# Patient Record
Sex: MALE | Race: AMERICAN INDIAN OR ALASKA NATIVE | NOT HISPANIC OR LATINO | Employment: UNEMPLOYED | URBAN - METROPOLITAN AREA
[De-identification: names, ages, dates, MRNs, and addresses within clinical notes are randomized per-mention and may not be internally consistent; named-entity substitution may affect disease eponyms.]

---

## 2021-05-24 ENCOUNTER — RECORDS - HEALTHEAST (OUTPATIENT)
Dept: ADMINISTRATIVE | Facility: CLINIC | Age: 46
End: 2021-05-24

## 2023-08-10 ENCOUNTER — OFFICE VISIT (OUTPATIENT)
Dept: BEHAVIORAL HEALTH | Facility: CLINIC | Age: 48
End: 2023-08-10
Payer: COMMERCIAL

## 2023-08-10 VITALS — SYSTOLIC BLOOD PRESSURE: 143 MMHG | WEIGHT: 190 LBS | HEART RATE: 66 BPM | DIASTOLIC BLOOD PRESSURE: 95 MMHG

## 2023-08-10 DIAGNOSIS — F33.41 RECURRENT MAJOR DEPRESSIVE DISORDER, IN PARTIAL REMISSION (H): ICD-10-CM

## 2023-08-10 DIAGNOSIS — Z72.0 TOBACCO USE: ICD-10-CM

## 2023-08-10 DIAGNOSIS — F11.20 OPIOID USE DISORDER, SEVERE, DEPENDENCE (H): Primary | ICD-10-CM

## 2023-08-10 DIAGNOSIS — F10.90 ALCOHOL USE DISORDER: ICD-10-CM

## 2023-08-10 DIAGNOSIS — Z51.81 ENCOUNTER FOR MONITORING OPIOID MAINTENANCE THERAPY: ICD-10-CM

## 2023-08-10 DIAGNOSIS — Z79.891 ENCOUNTER FOR MONITORING OPIOID MAINTENANCE THERAPY: ICD-10-CM

## 2023-08-10 LAB
AMPHETAMINE QUAL URINE POCT: NEGATIVE
BARBITURATE QUAL URINE POCT: NEGATIVE
BENZODIAZEPINE QUAL URINE POCT: NEGATIVE
BUPRENORPHINE QUAL URINE POCT: ABNORMAL
COCAINE QUAL URINE POCT: NEGATIVE
CREATININE QUAL URINE POCT: ABNORMAL
FENTANYL UR QL: NORMAL
INTERNAL QC QUAL URINE POCT: ABNORMAL
MDMA QUAL URINE POCT: NEGATIVE
METHADONE QUAL URINE POCT: NEGATIVE
METHAMPHETAMINE QUAL URINE POCT: NEGATIVE
OPIATE QUAL URINE POCT: NEGATIVE
OXYCODONE QUAL URINE POCT: NEGATIVE
PH QUAL URINE POCT: ABNORMAL
PHENCYCLIDINE QUAL URINE POCT: NEGATIVE
POCT KIT EXPIRATION DATE: ABNORMAL
POCT KIT LOT NUMBER: ABNORMAL
SPECIFIC GRAVITY POCT: 1.02
TEMPERATURE URINE POCT: ABNORMAL
THC QUAL URINE POCT: NEGATIVE

## 2023-08-10 PROCEDURE — 99205 OFFICE O/P NEW HI 60 MIN: CPT | Performed by: FAMILY MEDICINE

## 2023-08-10 PROCEDURE — 80307 DRUG TEST PRSMV CHEM ANLYZR: CPT | Performed by: FAMILY MEDICINE

## 2023-08-10 PROCEDURE — 99207 PR NO BILLABLE SERVICE THIS VISIT: CPT

## 2023-08-10 RX ORDER — OLANZAPINE 5 MG/1
5 TABLET ORAL AT BEDTIME
COMMUNITY
Start: 2022-10-17 | End: 2023-09-05

## 2023-08-10 RX ORDER — BUPRENORPHINE AND NALOXONE 8; 2 MG/1; MG/1
3 FILM, SOLUBLE BUCCAL; SUBLINGUAL DAILY
Qty: 45 FILM | Refills: 0 | Status: SHIPPED | OUTPATIENT
Start: 2023-08-10 | End: 2023-08-22

## 2023-08-10 RX ORDER — CYPROHEPTADINE HYDROCHLORIDE 4 MG/1
4 TABLET ORAL 3 TIMES DAILY PRN
COMMUNITY
End: 2024-03-08

## 2023-08-10 RX ORDER — VENLAFAXINE HYDROCHLORIDE 37.5 MG/1
CAPSULE, EXTENDED RELEASE ORAL
COMMUNITY
Start: 2022-11-11 | End: 2023-08-10

## 2023-08-10 RX ORDER — VITAMIN B COMPLEX
1 TABLET ORAL DAILY
COMMUNITY
Start: 2023-06-06 | End: 2024-03-08

## 2023-08-10 RX ORDER — OLANZAPINE 2.5 MG/1
2.5 TABLET, FILM COATED ORAL
COMMUNITY
Start: 2023-05-30 | End: 2023-09-05

## 2023-08-10 RX ORDER — VENLAFAXINE HYDROCHLORIDE 150 MG/1
150 CAPSULE, EXTENDED RELEASE ORAL DAILY
COMMUNITY
End: 2023-09-05

## 2023-08-10 RX ORDER — VENLAFAXINE HYDROCHLORIDE 75 MG/1
75 CAPSULE, EXTENDED RELEASE ORAL DAILY
COMMUNITY
Start: 2022-12-13 | End: 2023-08-10

## 2023-08-10 ASSESSMENT — COLUMBIA-SUICIDE SEVERITY RATING SCALE - C-SSRS
3. HAVE YOU BEEN THINKING ABOUT HOW YOU MIGHT KILL YOURSELF?: YES
LETHALITY/MEDICAL DAMAGE CODE FIRST ACTUAL ATTEMPT: NO PHYSICAL DAMAGE OR VERY MINOR PHYSICAL DAMAGE
5. HAVE YOU STARTED TO WORK OUT OR WORKED OUT THE DETAILS OF HOW TO KILL YOURSELF? DO YOU INTEND TO CARRY OUT THIS PLAN?: YES
1. IN THE PAST MONTH, HAVE YOU WISHED YOU WERE DEAD OR WISHED YOU COULD GO TO SLEEP AND NOT WAKE UP?: YES
TOTAL  NUMBER OF ABORTED OR SELF INTERRUPTED ATTEMPTS LIFETIME: NO
ATTEMPT LIFETIME: YES
LETHALITY/MEDICAL DAMAGE CODE MOST RECENT ACTUAL ATTEMPT: NO PHYSICAL DAMAGE OR VERY MINOR PHYSICAL DAMAGE
4. HAVE YOU HAD THESE THOUGHTS AND HAD SOME INTENTION OF ACTING ON THEM?: NO
TOTAL  NUMBER OF INTERRUPTED ATTEMPTS LIFETIME: NO
1. HAVE YOU WISHED YOU WERE DEAD OR WISHED YOU COULD GO TO SLEEP AND NOT WAKE UP?: YES
2. HAVE YOU ACTUALLY HAD ANY THOUGHTS OF KILLING YOURSELF?: YES
REASONS FOR IDEATION PAST MONTH: MOSTLY TO END OR STOP THE PAIN (YOU COULDN'T GO ON LIVING WITH THE PAIN OR HOW YOU WERE FEELING)
ATTEMPT PAST THREE MONTHS: NO
2. HAVE YOU ACTUALLY HAD ANY THOUGHTS OF KILLING YOURSELF?: YES
4. HAVE YOU HAD THESE THOUGHTS AND HAD SOME INTENTION OF ACTING ON THEM?: NO
5. HAVE YOU STARTED TO WORK OUT OR WORKED OUT THE DETAILS OF HOW TO KILL YOURSELF? DO YOU INTEND TO CARRY OUT THIS PLAN?: NO
6. HAVE YOU EVER DONE ANYTHING, STARTED TO DO ANYTHING, OR PREPARED TO DO ANYTHING TO END YOUR LIFE?: NO

## 2023-08-10 ASSESSMENT — PATIENT HEALTH QUESTIONNAIRE - PHQ9: SUM OF ALL RESPONSES TO PHQ QUESTIONS 1-9: 27

## 2023-08-10 NOTE — NURSING NOTE
Patient fills his medication with [facility] in [place]. He was followed by [facility] but recently transferred to [facility] related to transportation difficulties and getting his medication.  His coordinator through [facility] is Mara Guerrero.     Verna Doyle RN on 8/10/2023 at 3:04 PM

## 2023-08-10 NOTE — PROGRESS NOTES
"[facility] Recovery Clinic Initial Visit    ASSESSMENT/PLAN                                                      1. Opioid use disorder, severe, dependence (H)  49 yo male with long history of opioid use, on buprenorphine which was started prior to his release from [facility] Spring 2023 per pt, currently prescribed 24mg/day through [facility].  Unable to fill most recent rx, has been out of buprenorphine x4 days and experiencing withdrawal.  Denies return to use of full opioid agonist.   Resume buprenorphine 24mg/day.  Able to fill rx as noted with brand name.   Pt reports he has naloxone  Continue programming with Lauren  Pt states he intends to return to  for follow-up   Discuss infectious disease screening/rescreening next visit.   - Fentanyl Qualitative with Reflex to Quant Urine; Future  - buprenorphine HCl-naloxone HCl (SUBOXONE) 8-2 MG per film; Place 3 Film under the tongue daily  Dispense: 45 Film; Refill: 0  - Fentanyl Qualitative with Reflex to Quant Urine    2. Encounter for monitoring opioid maintenance therapy  - Drugs of Abuse Screen Urine (POC CUPS) POCT; Standing  - Drugs of Abuse Screen Urine (POC CUPS) POCT    3. Alcohol use disorder  Pt reports alcohol is his DOC, denies recent use or cravings.    Continue programming at [employer]    4. Tobacco use  Evaluate pt's goals next visit    5. Recurrent major depressive disorder, in partial remission (H)  Pt was not suicidal at conclusion of visit with resolution of rx problems.   Pt's psychiatrist at [facility] prescribed venlafaxine 150mg/day, clonidine 0.1mg bid, and discontinued olanzapine due to side effects of fatigue and lack of efficacy in reducing \"seeing shadows.\"  Today pt reported he is taking olanzapine and did not report clonidine.  Pt is scheduled for follow-up w/ [facility] psych 8/18/23.  Follow-up medications next visit.       Return in about 2 weeks (around 8/24/2023) for Follow up, in person.    Patient counseling completed today:  Discussed " mechanism of action, potential risks/benefits/side effects of medications and other recommendations above.    Harm reduction counseling including never use alone, availability of naloxone, avoiding combination of opioids with benzodiazepines, alcohol, or other sedatives, safer administration.      Discussed importance of avoiding isolation, building a network of supportive relationships, avoiding people/places/things associated with past use to reduce risk of relapse; including motivational interviewing regarding psychosocial treatment for addiction.     SUBJECTIVE                                                      CC/HPI:  Rm Merchant is a 48 year old male with PMH MDD, polysubstance use disorder including opioids on buprenorphine who presents to the Recovery Clinic for initial visit.      Brief History:  Rm Merchant was first seen in Recovery Clinic on 08/10/23. They were referred by staff at [facility] due to difficulties filling his most recent rx for buprenorphine through [facility].       Substance Use History :  Opioids:   Age at first use: 26 years heroin, use increased to daily while incarcerated x21 years.  Released Spring 2023.   Current use: substance: heroin; quantity alot; route: insufflation ; timing of last use: 2012      IV drug use: No   History of overdose: No  Previous residential or outpatient treatments for addiction : Yes: [facility] 6/7/23 completed residential then started OP  Previous medication treatments for addiction: Yes: suboxone  Longest period of sobriety: 2012 to present  Medical complications related to substance use: No  Hepatitis C: pt report neg; Date of most recent testing: no record  HIV: pt report neg; Date of most recent testing: no record    Other Addiction History:  Stimulants (cocaine, methamphetamine, MDMA/ecstasy)   Cocaine 13 years ago  Sedatives/hypnotics/anxiolytics: (benzodiazepines, GHB, Ambien, phenobarbital)  no  Alcohol:    past  Nicotine: (cigarettes, vaping, chew/snuff)  Yes, cigarettes  Cannabis:   past  Hallucinogens/Dissociatives: (acid, mushrooms, ketamine)  no  Eating disorder:  no  Gambling:   Yes, past      [place] Prescription Drug Monitoring Program Reviewed:  Yes  07/31/2023 07/28/2023 07/31/2023 3   Suboxone 12 Mg-3 Mg Sl Film  2.00 1 Ch Mar [id number] Gen (0831) 0/0 24.00 mg Medicaid [place]  07/20/2023 07/19/2023 07/20/2023 3   Suboxone 12 Mg-3 Mg Sl Film  22.00 11 Ch M        PAST PSYCHIATRIC HISTORY:  Diagnoses- PTSD, depression,anxiety  Suicide Attempts: No   Hospitalizations: No         8/10/2023     1:00 PM   PHQ   PHQ-9 Total Score 27   Q9: Thoughts of better off dead/self-harm past 2 weeks Nearly every day         If PHQ-9 score of 15 or higher, has Recovery Clinic therapist or provider been notified? Yes    Any current suicidal ideation? Yes, states all the time  If yes, has Recovery Clinic therapist or provider been notified? Yes    Mental health provider: [facility]; also met with psychiatry through [facility] 7/31/23       Social History  Housing status: in a sober house- [employer] - through [facility]  Employment status: Unemployed, seeking work  Relationship status: Single  Children: no children  Legal: parole 2025  Insurance needs: Medicaid  Contact information up to date? yes    [employer] signed RAMIRO for [facility]        Medications:  cyproheptadine (PERIACTIN) 4 MG tablet, Take 4 mg by mouth 3 times daily as needed for allergies  OLANZapine (ZYPREXA) 2.5 MG tablet, Take 2.5 mg by mouth  OLANZapine (ZYPREXA) 5 MG tablet, Take 5 mg by mouth At Bedtime  venlafaxine (EFFEXOR XR) 150 MG 24 hr capsule, Take 150 mg by mouth daily  Vitamin D3 (CHOLECALCIFEROL) 25 mcg (1000 units) tablet, Take 1 tablet by mouth daily    No current facility-administered medications on file prior to visit.      Not on File    No past medical history on file.    No past surgical history on file.    Family History   Problem  Relation Age of Onset    Substance Abuse Mother     Substance Abuse Father          REVIEW OF SYSTEMS:  States he is so frustrated by difficulties around getting his buprenorphine rx filled he is ready to return to full opioid agonist use and overdose if he is not able to get rx today.      OBJECTIVE                                                    BP (!) 143/95   Pulse 66   Wt 86.2 kg (190 lb)     Physical Exam  Constitutional:       Appearance: Normal appearance.   Eyes:      Extraocular Movements: Extraocular movements intact.   Pulmonary:      Effort: Pulmonary effort is normal.   Neurological:      Mental Status: He is alert and oriented to person, place, and time.      Coordination: Coordination is intact.      Gait: Gait is intact.   Psychiatric:         Attention and Perception: Attention normal.         Mood and Affect: Mood is anxious and depressed. Affect is not inappropriate.         Speech: Speech normal.         Behavior: Behavior is cooperative.      Comments: Insight and judgement are fair           Labs:    UDS:   No results found for: BUP, BZO, BAR, CLIFFORD, MAMP, AMP, MDMA, MTD, KKA969, OXY, PCP, THC, TEMP, SGPOCT    *POC urine drug screen does not screen for Fentanyl    Recent Results (from the past 720 hour(s))   Drugs of Abuse Screen Urine (POC CUPS) POCT    Collection Time: 08/10/23  1:50 PM   Result Value Ref Range    POCT Kit Lot Number [id number]     POCT Kit Expiration Date 112024     Temperature Urine POCT 92 F 90 F, 92 F, 94 F, 96 F, 98 F, 100 F    Specific [place] POCT 1.025 1.005, 1.015, 1.025    pH Qual Urine POCT 5 pH 4 pH, 5 pH, 7 pH, 9 pH    Creatinine Qual Urine POCT 50 mg/dL 20 mg/dL, 50 mg/dL, 100 mg/dL, 200 mg/dL    Internal QC Qual Urine POCT Valid Valid    Amphetamine Qual Urine POCT Negative Negative    Barbiturate Qual Urine POCT Negative Negative    Buprenorphine Qual Urine POCT Screen Positive (A) Negative    Benzodiazepine Qual Urine POCT Negative Negative    Cocaine Qual  Urine POCT Negative Negative    Methamphetamine Qual Urine POCT Negative Negative    MDMA Qual Urine POCT Negative Negative    Methadone Qual Urine POCT Negative Negative    Opiate Qual Urine POCT Negative Negative    Oxycodone Qual Urine POCT Negative Negative    Phencyclidine Qual Urine POCT Negative Negative    THC Qual Urine POCT Negative Negative   Fentanyl Qualitative with Reflex to Quant Urine    Collection Time: 08/10/23  3:50 PM   Result Value Ref Range    Fentanyl Qual Urine Screen Negative Screen Negative               At least 60 min spent in review of medical record,  review, obtaining histories, discussing recommendations, counseling, providing support.      Kassie Mckinley MD  Addiction Medicine  [place] [address] [id number] [phone]

## 2023-08-10 NOTE — NURSING NOTE
RN was notified by rooming staff that the patient had an elevated PHQ-9 score and answered greater than 0 on question 9 indicating thoughts that they would be better off dead, or hurting themself in some way. RN met with patient to complete the C-SSRS.    Patient States he was released from jail in May of 2023. He went from being homeless without resources to being taken in by [facility] treatment. He is currently in sober housing through [facility]. He states his current stress and thoughts of wishing he were dead stem from being without his Suboxone for the past 6 days. He has left message with staff but does not know what is holding up his refill. He feels if he walks out of this clinic today, he will find a way to go and get heroin and overdose himself. He states he cold never kill himself but has put himself in harms way in the past such as pretending he had a gun in front of police to not be take into custody and using substances.  (describe context of current thoughts).     Patient  active suicidal thoughts with specific plan to overdose on Heroin if his needs are not met.  He denies any intent to harm himself if he can get help navigating his medication issue.     [place]-Suicide Severity Rating Scale (C-SSRS) score: moderate risk    RN updated the provider with C-SSRS risk level.     Current stressors/contributing factors include: Unable to get his medication to help with his Opioid Addiction.     Patient identified the following protective factors: Believes there are people in the world that are generally good and want to help.     Patient was given the number for the National Suicide Prevention Line (992) along with a list of crisis resources and numbers.  He states he will not call the number.     Verna Doyle RN on 8/10/2023 at 2:03 PM

## 2023-08-10 NOTE — PROGRESS NOTES
"[facility]    Peer  met with Rm Merchant in the Recovery Clinic to introduce himself, detail services provided and discuss current status of recovery. Pt appeared alert, oriented and open to feedback during our discussion.  PRC sees patient today under provider diagnosis of opioid substance disorder, serve, dependence  H     Pt arrives for visit with provider for suboxone.  Rm  explained  was  sentenced 22 years. Rm  reports just released from skilled nursing May 19, 2023.  Rm shared at [facility] and a sober house.  Rm explained unable to get Suboxone filled at [place]. Patient appeared frustrated.  Rm stated, \" if dont get Soboxone will go do [facility].  Writer asked if he relapsed.  He shared no.  Writer stated nurse and doctor  will help you.  Writer encouraged to make good decisions and thanked him for coming to The [facility].        [facility] provided business card to pt welcoming contact for recovery based support and resources. PRC and pt agree to speak again during an upcoming  visit.           Service Type:     Individual     Session Start Time:         1:45 pm              Session End Time:    2:00 pm    Session Length:         15 mins     Patient Goal:   To utilize suboxone assisted treatment for sobriety and long term recovery.     Goal Progress:   Ongoing.    Key Risk Factors to Recovery:   [facility] encourages being aware of risk factors that can lead to re-use which include avoiding isolation, avoiding triggers and managing cravings in a healthy manner. being open and willing to acceptance and change on a daily basis.  [facility] encourages pt to establish a sober network calling tree to reach out to when needed.  Continue to practice honesty with ourselves and trusted support person(s).   [facility] encourages regular attendance at recovery based meetings as well as finding a sponsor for mentoring and accountability.   [facility] encourages " consideration of other services such as counseling for mental health issues which can correlate with our substance use.      Support Needs:   Ongoing care, support and resources for opioid substance use disorder.     Follow up:   PRC has provided pt with his contact information for support and resource needs.    PRC and pt agree to meet during an upcoming  visit.       [facility]  [address], [address]   [place], [place], [address]  Clinic Phone: [phone]  Clinic Fax: [phone]  Peer  phone: [phone]    Open Monday - Friday  9:00am-4:00pm  Walk in hours: 9am-3pm      Cyn Chase  August 10, 2023  1:40 PM    LUAN Matta provides clinical oversite and supervision of care.

## 2023-08-22 ENCOUNTER — TELEPHONE (OUTPATIENT)
Dept: BEHAVIORAL HEALTH | Facility: CLINIC | Age: 48
End: 2023-08-22
Payer: COMMERCIAL

## 2023-08-22 ENCOUNTER — TELEPHONE (OUTPATIENT)
Dept: BEHAVIORAL HEALTH | Facility: CLINIC | Age: 48
End: 2023-08-22

## 2023-08-22 ENCOUNTER — OFFICE VISIT (OUTPATIENT)
Dept: BEHAVIORAL HEALTH | Facility: CLINIC | Age: 48
End: 2023-08-22
Payer: COMMERCIAL

## 2023-08-22 ENCOUNTER — HOSPITAL ENCOUNTER (EMERGENCY)
Facility: CLINIC | Age: 48
Discharge: HOME | End: 2023-08-22
Attending: EMERGENCY MEDICINE | Admitting: EMERGENCY MEDICINE
Payer: COMMERCIAL

## 2023-08-22 VITALS — DIASTOLIC BLOOD PRESSURE: 86 MMHG | SYSTOLIC BLOOD PRESSURE: 126 MMHG | RESPIRATION RATE: 20 BRPM | HEART RATE: 72 BPM

## 2023-08-22 VITALS
SYSTOLIC BLOOD PRESSURE: 130 MMHG | TEMPERATURE: 98.5 F | RESPIRATION RATE: 18 BRPM | OXYGEN SATURATION: 92 % | DIASTOLIC BLOOD PRESSURE: 87 MMHG | HEART RATE: 69 BPM

## 2023-08-22 DIAGNOSIS — Z51.81 ENCOUNTER FOR MONITORING SUBOXONE MAINTENANCE THERAPY: ICD-10-CM

## 2023-08-22 DIAGNOSIS — Z11.3 SCREEN FOR STD (SEXUALLY TRANSMITTED DISEASE): ICD-10-CM

## 2023-08-22 DIAGNOSIS — F10.90 ALCOHOL USE DISORDER: ICD-10-CM

## 2023-08-22 DIAGNOSIS — Z51.81 ENCOUNTER FOR MONITORING OPIOID MAINTENANCE THERAPY: ICD-10-CM

## 2023-08-22 DIAGNOSIS — Z79.891 ENCOUNTER FOR MONITORING OPIOID MAINTENANCE THERAPY: ICD-10-CM

## 2023-08-22 DIAGNOSIS — F33.41 RECURRENT MAJOR DEPRESSIVE DISORDER, IN PARTIAL REMISSION (H): ICD-10-CM

## 2023-08-22 DIAGNOSIS — Z79.899 ENCOUNTER FOR MONITORING SUBOXONE MAINTENANCE THERAPY: ICD-10-CM

## 2023-08-22 DIAGNOSIS — Z76.89 ENCOUNTER FOR MEDICATION ADMINISTRATION: ICD-10-CM

## 2023-08-22 DIAGNOSIS — F11.20 OPIOID USE DISORDER, SEVERE, DEPENDENCE (H): Primary | ICD-10-CM

## 2023-08-22 LAB
AMPHETAMINE QUAL URINE POCT: NEGATIVE
BARBITURATE QUAL URINE POCT: NEGATIVE
BENZODIAZEPINE QUAL URINE POCT: NEGATIVE
BUPRENORPHINE QUAL URINE POCT: ABNORMAL
COCAINE QUAL URINE POCT: NEGATIVE
CREATININE QUAL URINE POCT: ABNORMAL
INTERNAL QC QUAL URINE POCT: ABNORMAL
MDMA QUAL URINE POCT: NEGATIVE
METHADONE QUAL URINE POCT: NEGATIVE
METHAMPHETAMINE QUAL URINE POCT: NEGATIVE
OPIATE QUAL URINE POCT: NEGATIVE
OXYCODONE QUAL URINE POCT: NEGATIVE
PH QUAL URINE POCT: ABNORMAL
PHENCYCLIDINE QUAL URINE POCT: NEGATIVE
POCT KIT EXPIRATION DATE: ABNORMAL
POCT KIT LOT NUMBER: ABNORMAL
SPECIFIC GRAVITY POCT: 1.02
TEMPERATURE URINE POCT: ABNORMAL
THC QUAL URINE POCT: NEGATIVE

## 2023-08-22 PROCEDURE — 250N000013 HC RX MED GY IP 250 OP 250 PS 637: Performed by: EMERGENCY MEDICINE

## 2023-08-22 PROCEDURE — 99283 EMERGENCY DEPT VISIT LOW MDM: CPT | Performed by: EMERGENCY MEDICINE

## 2023-08-22 PROCEDURE — 99214 OFFICE O/P EST MOD 30 MIN: CPT | Performed by: NURSE PRACTITIONER

## 2023-08-22 PROCEDURE — 80305 DRUG TEST PRSMV DIR OPT OBS: CPT | Performed by: NURSE PRACTITIONER

## 2023-08-22 RX ORDER — BUPRENORPHINE HYDROCHLORIDE AND NALOXONE HYDROCHLORIDE DIHYDRATE 8; 2 MG/1; MG/1
3 TABLET SUBLINGUAL ONCE
Status: COMPLETED | OUTPATIENT
Start: 2023-08-22 | End: 2023-08-22

## 2023-08-22 RX ORDER — BUPRENORPHINE AND NALOXONE 8; 2 MG/1; MG/1
3 FILM, SOLUBLE BUCCAL; SUBLINGUAL DAILY
Qty: 45 FILM | Refills: 0 | Status: SHIPPED | OUTPATIENT
Start: 2023-08-22 | End: 2023-09-05

## 2023-08-22 RX ADMIN — BUPRENORPHINE AND NALOXONE 3 TABLET: 8; 2 TABLET SUBLINGUAL at 17:21

## 2023-08-22 ASSESSMENT — COLUMBIA-SUICIDE SEVERITY RATING SCALE - C-SSRS
LETHALITY/MEDICAL DAMAGE CODE MOST LETHAL ACTUAL ATTEMPT: NO PHYSICAL DAMAGE OR VERY MINOR PHYSICAL DAMAGE
1. SINCE LAST CONTACT, HAVE YOU WISHED YOU WERE DEAD OR WISHED YOU COULD GO TO SLEEP AND NOT WAKE UP?: NO
TOTAL  NUMBER OF ABORTED OR SELF INTERRUPTED ATTEMPTS SINCE LAST CONTACT: NO
2. HAVE YOU ACTUALLY HAD ANY THOUGHTS OF KILLING YOURSELF?: NO
SUICIDE, SINCE LAST CONTACT: NO
ATTEMPT SINCE LAST CONTACT: NO
TOTAL  NUMBER OF INTERRUPTED ATTEMPTS SINCE LAST CONTACT: NO
LETHALITY/MEDICAL DAMAGE CODE MOST LETHAL POTENTIAL ATTEMPT: BEHAVIOR NOT LIKELY TO RESULT IN INJURY
6. HAVE YOU EVER DONE ANYTHING, STARTED TO DO ANYTHING, OR PREPARED TO DO ANYTHING TO END YOUR LIFE?: NO

## 2023-08-22 ASSESSMENT — PATIENT HEALTH QUESTIONNAIRE - PHQ9: SUM OF ALL RESPONSES TO PHQ QUESTIONS 1-9: 18

## 2023-08-22 ASSESSMENT — PAIN SCALES - GENERAL: PAINLEVEL: NO PAIN (0)

## 2023-08-22 ASSESSMENT — ACTIVITIES OF DAILY LIVING (ADL): ADLS_ACUITY_SCORE: 35

## 2023-08-22 NOTE — NURSING NOTE
[facility]      Rooming information:  Approximate last use of full opioid agonist: couple yeas ago  Taking buprenorphine? Yes:   How much per day? 3   Number of buprenorphine films/tablets remaining currently: 0  Side effects related to buprenorphine (constipation, dry mouth, sedation?) No   Narcan currently available: Yes  Other recent substance use:    Denies  NICOTINE-Yes: cigarettes   If using nicotine, ready to quit? No    Point of care urine drug screen positive for:  Lab Results   Component Value Date    BUP Screen Positive (A) 08/22/2023    BZO Negative 08/22/2023    BAR Negative 08/22/2023    CLIFFORD Negative 08/22/2023    MAMP Negative 08/22/2023    AMP Negative 08/22/2023    MDMA Negative 08/22/2023    MTD Negative 08/22/2023    [id number] Negative 08/22/2023    OXY Negative 08/22/2023    PCP Negative 08/22/2023    THC Negative 08/22/2023    TEMP 92 F 08/22/2023    SGPOCT 1.025 08/22/2023       *POC urine drug screen does not screen for Fentanyl            8/22/2023     2:00 PM   PHQ Assesment Total Score(s)   PHQ-9 Score 18       If PHQ-9 score of 15 or higher, has Recovery Clinic therapist or provider been notified? Yes    Any current suicidal ideation? Yes - just thoughts no plan   If yes, has Recovery Clinic therapist or provider been notified? Ywa     Primary care provider: Physician No Ref-Primary     Mental health provider: denies  (follow up on MH referral if needed)    Insurance needs: active     Housing needs: stable    Contact information up to date? yes    [employer] not at this time (please obtain RAMIRO if pt would like to include)    Kiah Wade LPN  August 22, 2023  2:32 PM

## 2023-08-22 NOTE — ED TRIAGE NOTES
Requesting suboxone     Triage Assessment       Row Name 08/22/23 9139       Triage Assessment (Adult)    Airway WDL WDL       Respiratory WDL    Respiratory WDL WDL       Skin Circulation/Temperature WDL    Skin Circulation/Temperature WDL WDL       Cardiac WDL    Cardiac WDL WDL       Cognitive/Neuro/Behavioral WDL    Cognitive/Neuro/Behavioral WDL WDL
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
## 2023-08-22 NOTE — TELEPHONE ENCOUNTER
Phone call to [facility]. Patient will be able to fill new Suboxone rx tomorrow, 8/23/23.     Phone call to patient. Encouraged patient to come to the Recovery Clinic today as a walk-in. Informed patient that insurance will not allow him to fill Suboxone rx until tomorrow. Patient verbalized understanding and plans to present to the Recovery Clinic today.    Routing to Recovery Clinic providers as DANNIE    [place] [id number], [address]   [place], [place], [address]  Phone: [phone]  Fax: [phone]    Open Monday-Friday  Closed over lunch hour  Walk in hours: 9am-11:30am and 12:30-3pm    Betzy Krishna RN on 8/22/2023 at 11:39 AM

## 2023-08-22 NOTE — ED NOTES
"This writer was assigned unit Substance Use Navigator (SUN) for the shift and was notified at roughly 1610 that Pt was presenting seeking Suboxone. Pt states to this writer that he attempted to refill his preexisting prescription today, as he has run out, but insurance was not willing to fill it until tomorrow. He was directed to present here for a dose to \"hold him over.\" Pt states that he was addicted to heroine while in long-term from between 1569-8344, but has not used since 2012. Further states that he is currently experiencing what he deems to be \"significant withdrawals.\" Pt lives at a [facility] sober living home through [facility]. Pt routinely uses Suboxone, has an existing prescription for 8-2mg films TID (total daily dose of 24mg Suboxone). Pt denies any recreational drug use today, also has not used any of his prescribed Suboxone due to lack of supply.    Pt would like to receive at least a single dose here in the ED and would like to return to [facility].  "

## 2023-08-22 NOTE — TELEPHONE ENCOUNTER
Reason for Call:  Other prescription    Detailed comments: Pt left vm at [facility] requesting suboxone bridge to Friday 8/25/23. Reports medication is lost and unable to locate them.     Phone Number Patient can be reached at: [phone]      Best Time: Any    Can we leave a detailed message on this number? YES    Call taken on 8/22/2023 at 11:16 AM by Heidi Driver

## 2023-08-22 NOTE — DISCHARGE INSTRUCTIONS
TODAY'S VISIT:  You were seen today for suboxone dose  -   - If you had any labs or imaging/radiology tests performed today, you should also discuss these tests with your usual provider.     FOLLOW-UP:  Please make an appointment to follow up with:  - Your Primary Care Provider. If you do not have a PCP, please call the Primary Care Center (phone: [phone] for an appointment  -  the recovery clinic as directed    - Have your provider review the results from today's visit with you again to make sure no further follow-up or additional testing is needed based on those results.     RETURN TO THE EMERGENCY DEPARTMENT  Return to the Emergency Department at any time for any new or worsening symptoms or any concerns.

## 2023-09-05 ENCOUNTER — OFFICE VISIT (OUTPATIENT)
Dept: BEHAVIORAL HEALTH | Facility: CLINIC | Age: 48
End: 2023-09-05
Payer: COMMERCIAL

## 2023-09-05 VITALS — HEART RATE: 65 BPM | DIASTOLIC BLOOD PRESSURE: 80 MMHG | SYSTOLIC BLOOD PRESSURE: 123 MMHG

## 2023-09-05 DIAGNOSIS — Z51.81 ENCOUNTER FOR MONITORING OPIOID MAINTENANCE THERAPY: ICD-10-CM

## 2023-09-05 DIAGNOSIS — Z79.891 ENCOUNTER FOR MONITORING OPIOID MAINTENANCE THERAPY: ICD-10-CM

## 2023-09-05 DIAGNOSIS — F33.41 RECURRENT MAJOR DEPRESSIVE DISORDER, IN PARTIAL REMISSION (H): ICD-10-CM

## 2023-09-05 DIAGNOSIS — F11.20 OPIOID USE DISORDER, SEVERE, DEPENDENCE (H): Primary | ICD-10-CM

## 2023-09-05 PROCEDURE — 80299 QUANTITATIVE ASSAY DRUG: CPT | Mod: 90 | Performed by: FAMILY MEDICINE

## 2023-09-05 PROCEDURE — 99000 SPECIMEN HANDLING OFFICE-LAB: CPT | Performed by: FAMILY MEDICINE

## 2023-09-05 PROCEDURE — 80305 DRUG TEST PRSMV DIR OPT OBS: CPT | Performed by: FAMILY MEDICINE

## 2023-09-05 PROCEDURE — 99214 OFFICE O/P EST MOD 30 MIN: CPT | Performed by: FAMILY MEDICINE

## 2023-09-05 RX ORDER — BUPRENORPHINE AND NALOXONE 8; 2 MG/1; MG/1
3 FILM, SOLUBLE BUCCAL; SUBLINGUAL DAILY
Qty: 45 FILM | Refills: 0 | Status: SHIPPED | OUTPATIENT
Start: 2023-09-05 | End: 2023-09-18

## 2023-09-05 RX ORDER — OLANZAPINE 5 MG/1
TABLET ORAL
Qty: 45 TABLET | Refills: 0 | Status: SHIPPED | OUTPATIENT
Start: 2023-09-05 | End: 2023-11-22

## 2023-09-05 RX ORDER — VENLAFAXINE HYDROCHLORIDE 150 MG/1
150 CAPSULE, EXTENDED RELEASE ORAL DAILY
Qty: 30 CAPSULE | Refills: 1 | Status: SHIPPED | OUTPATIENT
Start: 2023-09-05 | End: 2023-11-22

## 2023-09-05 ASSESSMENT — COLUMBIA-SUICIDE SEVERITY RATING SCALE - C-SSRS
TOTAL  NUMBER OF INTERRUPTED ATTEMPTS SINCE LAST CONTACT: NO
TOTAL  NUMBER OF ABORTED OR SELF INTERRUPTED ATTEMPTS SINCE LAST CONTACT: NO
ATTEMPT SINCE LAST CONTACT: NO
6. HAVE YOU EVER DONE ANYTHING, STARTED TO DO ANYTHING, OR PREPARED TO DO ANYTHING TO END YOUR LIFE?: NO
2. HAVE YOU ACTUALLY HAD ANY THOUGHTS OF KILLING YOURSELF?: NO
SUICIDE, SINCE LAST CONTACT: NO
1. SINCE LAST CONTACT, HAVE YOU WISHED YOU WERE DEAD OR WISHED YOU COULD GO TO SLEEP AND NOT WAKE UP?: NO

## 2023-09-05 ASSESSMENT — PATIENT HEALTH QUESTIONNAIRE - PHQ9: SUM OF ALL RESPONSES TO PHQ QUESTIONS 1-9: 11

## 2023-09-05 NOTE — NURSING NOTE
ASTHMA ACTION PLAN for Segun Chino     : 1971     Date: 2021  Provider:  Noreen Stokes MD  Phone for doctor or clinic: [facility] [facility], [address] [place] [address] , [facility] [phone] RN was notified by rooming staff that the patient had an elevated PHQ-9 score and answered greater than 0 on question 9 indicating thoughts that they would be better off dead, or hurting themself in some way. RN met with patient to complete the C-SSRS.    Patient  denies current or recent suicidal ideation or behavior. Patient denies plan, denies methods, denies intent.    [place]-Suicide Severity Rating Scale (C-SSRS) score: no risk    RN updated the provider with C-SSRS risk level.     Betzy Krishna RN on 9/5/2023 at 2:31 PM

## 2023-09-05 NOTE — NURSING NOTE
[facility]      Rooming information:  Approximate last use of full opioid agonist: COUPLE YEARS   Taking buprenorphine? Yes:   How much per day? 24MG A DAY  Number of buprenorphine films/tablets remaining currently: 0  Side effects related to buprenorphine (constipation, dry mouth, sedation?) Yes: RUNNING NOSE   Narcan currently available: Yes  Other recent substance use:    Denies  NICOTINE-Yes: CIGARETTES  If using nicotine, ready to quit? No    Point of care urine drug screen positive for:  Lab Results   Component Value Date    BUP Screen Positive (A) 09/05/2023    BZO Negative 09/05/2023    BAR Negative 09/05/2023    CLIFFORD Negative 09/05/2023    MAMP Negative 09/05/2023    AMP Negative 09/05/2023    MDMA Negative 09/05/2023    MTD Negative 09/05/2023    QAT985 Negative 09/05/2023    OXY Negative 09/05/2023    PCP Negative 09/05/2023    THC Negative 09/05/2023    TEMP 96 F 09/05/2023    SGPOCT 1.025 09/05/2023       *POC urine drug screen does not screen for Fentanyl            9/5/2023     2:00 PM   PHQ Assesment Total Score(s)   PHQ-9 Score 11       If PHQ-9 score of 15 or higher, has Recovery Clinic therapist or provider been notified? No    Any current suicidal ideation? Yes, THOUGHTS ONLY  If yes, has [facility] therapist or provider been notified? Yes    Primary care provider: Physician No Ref-Primary     Mental health provider: DENIES (follow up on MH referral if needed)    Insurance needs: ACTIVE    Housing needs: STABLE    Contact information up to date? YES    [employer] NOT TODAY (please obtain RAMIRO if pt would like to include)    Orville Kent MA  September 5, 2023  2:12 PM
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
## 2023-09-08 LAB
BUPRENORPHINE UR-MCNC: 26 NG/ML
BUPRENORPHINE UR-MCNC: <2 NG/ML
NALOXONE UR CFM-MCNC: <100 NG/ML
NORBUPRENORPHINE UR CFM-MCNC: 225 NG/ML
NORBUPRENORPHINE UR-MCNC: 141 NG/ML

## 2023-09-18 ENCOUNTER — OFFICE VISIT (OUTPATIENT)
Dept: BEHAVIORAL HEALTH | Facility: CLINIC | Age: 48
End: 2023-09-18
Payer: COMMERCIAL

## 2023-09-18 ENCOUNTER — LAB (OUTPATIENT)
Dept: LAB | Facility: CLINIC | Age: 48
End: 2023-09-18
Payer: COMMERCIAL

## 2023-09-18 VITALS — DIASTOLIC BLOOD PRESSURE: 89 MMHG | SYSTOLIC BLOOD PRESSURE: 139 MMHG | HEART RATE: 62 BPM

## 2023-09-18 DIAGNOSIS — F11.20 OPIOID USE DISORDER, SEVERE, DEPENDENCE (H): ICD-10-CM

## 2023-09-18 DIAGNOSIS — F11.20 OPIOID USE DISORDER, SEVERE, DEPENDENCE (H): Primary | ICD-10-CM

## 2023-09-18 DIAGNOSIS — Z51.81 ENCOUNTER FOR MONITORING OPIOID MAINTENANCE THERAPY: ICD-10-CM

## 2023-09-18 DIAGNOSIS — F33.41 RECURRENT MAJOR DEPRESSIVE DISORDER, IN PARTIAL REMISSION (H): ICD-10-CM

## 2023-09-18 DIAGNOSIS — F43.23 ADJUSTMENT DISORDER WITH MIXED ANXIETY AND DEPRESSED MOOD: Primary | ICD-10-CM

## 2023-09-18 DIAGNOSIS — Z11.3 SCREEN FOR STD (SEXUALLY TRANSMITTED DISEASE): ICD-10-CM

## 2023-09-18 DIAGNOSIS — Z79.891 ENCOUNTER FOR MONITORING OPIOID MAINTENANCE THERAPY: ICD-10-CM

## 2023-09-18 PROCEDURE — 86704 HEP B CORE ANTIBODY TOTAL: CPT

## 2023-09-18 PROCEDURE — 86803 HEPATITIS C AB TEST: CPT

## 2023-09-18 PROCEDURE — 99214 OFFICE O/P EST MOD 30 MIN: CPT | Performed by: FAMILY MEDICINE

## 2023-09-18 PROCEDURE — 99207 PR NO BILLABLE SERVICE THIS VISIT: CPT | Performed by: SOCIAL WORKER

## 2023-09-18 PROCEDURE — 36415 COLL VENOUS BLD VENIPUNCTURE: CPT

## 2023-09-18 PROCEDURE — 87340 HEPATITIS B SURFACE AG IA: CPT

## 2023-09-18 PROCEDURE — 86780 TREPONEMA PALLIDUM: CPT

## 2023-09-18 PROCEDURE — 87522 HEPATITIS C REVRS TRNSCRPJ: CPT

## 2023-09-18 PROCEDURE — 80305 DRUG TEST PRSMV DIR OPT OBS: CPT | Performed by: FAMILY MEDICINE

## 2023-09-18 PROCEDURE — 87389 HIV-1 AG W/HIV-1&-2 AB AG IA: CPT

## 2023-09-18 PROCEDURE — 86706 HEP B SURFACE ANTIBODY: CPT

## 2023-09-18 RX ORDER — BUPRENORPHINE AND NALOXONE 8; 2 MG/1; MG/1
3 FILM, SOLUBLE BUCCAL; SUBLINGUAL DAILY
Qty: 90 FILM | Refills: 0 | Status: SHIPPED | OUTPATIENT
Start: 2023-09-18 | End: 2023-10-23

## 2023-09-18 ASSESSMENT — COLUMBIA-SUICIDE SEVERITY RATING SCALE - C-SSRS
1. SINCE LAST CONTACT, HAVE YOU WISHED YOU WERE DEAD OR WISHED YOU COULD GO TO SLEEP AND NOT WAKE UP?: NO
LETHALITY/MEDICAL DAMAGE CODE MOST LETHAL ACTUAL ATTEMPT: NO PHYSICAL DAMAGE OR VERY MINOR PHYSICAL DAMAGE
TOTAL  NUMBER OF ABORTED OR SELF INTERRUPTED ATTEMPTS SINCE LAST CONTACT: NO
LETHALITY/MEDICAL DAMAGE CODE MOST LETHAL POTENTIAL ATTEMPT: BEHAVIOR NOT LIKELY TO RESULT IN INJURY
6. HAVE YOU EVER DONE ANYTHING, STARTED TO DO ANYTHING, OR PREPARED TO DO ANYTHING TO END YOUR LIFE?: NO
SUICIDE, SINCE LAST CONTACT: NO
2. HAVE YOU ACTUALLY HAD ANY THOUGHTS OF KILLING YOURSELF?: NO
TOTAL  NUMBER OF INTERRUPTED ATTEMPTS SINCE LAST CONTACT: NO
ATTEMPT SINCE LAST CONTACT: NO

## 2023-09-18 ASSESSMENT — PATIENT HEALTH QUESTIONNAIRE - PHQ9: SUM OF ALL RESPONSES TO PHQ QUESTIONS 1-9: 16

## 2023-09-18 NOTE — NURSING NOTE
[facility] Recovery Clinic    STATES GOAL IS TO START WEINING SELF OFF OF SUBOXONE  Rooming information:  Approximate last use of full opioid agonist: LONG TIME  Taking buprenorphine? Yes:   How much per day? 3 STRIPS, 24MG  Number of buprenorphine films/tablets remaining currently: 0  Side effects related to buprenorphine (constipation, dry mouth, sedation?) No   Narcan currently available: Yes  Other recent substance use:    Denies  NICOTINE-Yes: CIGARETTES  If using nicotine, ready to quit? Yes: EVENTUALLY    Point of care urine drug screen positive for:  Lab Results   Component Value Date    BUP Screen Positive (A) 09/05/2023    BZO Negative 09/05/2023    BAR Negative 09/05/2023    CLIFFORD Negative 09/05/2023    MAMP Negative 09/05/2023    AMP Negative 09/05/2023    MDMA Negative 09/05/2023    MTD Negative 09/05/2023    [id number] Negative 09/05/2023    OXY Negative 09/05/2023    PCP Negative 09/05/2023    THC Negative 09/05/2023    TEMP 96 F 09/05/2023    SGPOCT 1.025 09/05/2023       *POC urine drug screen does not screen for Fentanyl          9/5/2023     2:00 PM   PHQ Assesment Total Score(s)   PHQ-9 Score 11       If PHQ-9 score of 15 or higher, has Recovery Clinic therapist or provider been notified? Yes    Any current suicidal ideation? Yes, STATES THOUGHTS ONLY - ANSWERED HOW FEELING TODAY  If yes, has Recovery Clinic therapist or provider been notified? Yes    Primary care provider: Physician No Ref-Primary     Mental health provider: DENIES (follow up on MH referral if needed)    Insurance needs: ACTIVE    Housing needs: STABLE AT [facility]    Contact information up to date? YES    [employer]  NOT TODAY (please obtain RAMIRO if pt would like to include)    Orville Kent MA  September 18, 2023  1:23 PM

## 2023-09-18 NOTE — PROGRESS NOTES
The patient stated that he is not suicidal and we completed the [place] due to having elevated score on the PHQ-9 as he stated that he gave the answer to question 9 because recently he was in withdraws and feeling depressed however today he is feeling better-we discussed him being more regular with his appointments to make sure that he does not run out of medications early and have no gaps in his medications as this medication is a tool towards his success in sobriety as he was also talking about his desire to taper off suboxone in the near future-his scored no risk on the [place].

## 2023-09-18 NOTE — PROGRESS NOTES
[facility] - [facility] Clinic Follow Up    ASSESSMENT/PLAN                                                      1. Opioid use disorder, severe, dependence (H)  Reports symptoms are well controlled, continue Suboxone 8-2mg TID.  He would like to come off buprenorphine in the future but no plans to make changes now.  Has Narcan.  Encouraged continued programming at [facility], encouraged plan to become CPRS.    - buprenorphine HCl-naloxone HCl (SUBOXONE) 8-2 MG per film; Place 3 Film under the tongue daily  Dispense: 90 Film; Refill: 0  - Fentanyl Qualitative with Reflex to Quant Urine; Future  - Fentanyl Qualitative with Reflex to Quant Urine    2. Encounter for monitoring opioid maintenance therapy  - Drugs of Abuse Screen Urine (POC CUPS) POCT  - Drugs of Abuse Screen Urine (POC CUPS) POCT    3. Recurrent major depressive disorder, in partial remission (H)  Overall reports mood has been more positive, feeling low when ran out of meds.  Reports working with psychiatrist through [facility], declines additional referral.  Met with clinic therapist for C-SSRS and was no risk.         Return in about 4 weeks (around 10/16/2023).    Patient counseling completed today:  Discussed mechanism of action, potential risks/benefits/side effects of medications and other recommendations above.     Discussed risk of precipitated withdrawal with initiation of buprenorphine in the presence of full opioid agonists.    Reviewed directions for initiation of buprenorphine to reduce risk of precipitated withdrawal and maximize efficacy.    Harm reduction counseling including never use alone, availability of naloxone, risks associated with concurrent use of opioids and benzodiazepines, alcohol, or other sedatives, safer administration as applicable.  Discussed importance of avoiding isolation, building a network of supportive relationships, avoiding people/places/things associated with past use to reduce risk of relapse; including  motivational interviewing regarding psychosocial interventions.     SUBJECTIVE                                                          CC/HPI:  Rm Merchant is a 48 year old male with PMH MDD, polysubstance use disorder including opioids on buprenorphine who presents to the Recovery Clinic for initial visit.       Brief History:  Rm Merchant was first seen in Recovery Clinic on 08/10/23. They were referred by staff at [facility] due to difficulties filling his most recent rx for buprenorphine through [facility].        Substance Use History :  Opioids:   Age at first use: 26 years heroin, use increased to daily while incarcerated x21 years.  Released Spring 2023.   Current use: substance: heroin; quantity alot; route: insufflation ; timing of last use: 2012                 IV drug use: No   History of overdose: No  Previous residential or outpatient treatments for addiction : Yes: [facility] 6/7/23 completed residential then started OP  Previous medication treatments for addiction: Yes: suboxone  Longest period of sobriety: 2012 to present  Medical complications related to substance use: No  Hepatitis C: pt report neg; Date of most recent testing: no record  HIV: pt report neg; Date of most recent testing: no record     Other Addiction History:  Stimulants (cocaine, methamphetamine, MDMA/ecstasy)   Cocaine 13 years ago  Sedatives/hypnotics/anxiolytics: (benzodiazepines, GHB, Ambien, phenobarbital)  no  Alcohol:   past  Nicotine: (cigarettes, vaping, chew/snuff)  Yes, cigarettes  Cannabis:   past  Hallucinogens/Dissociatives: (acid, mushrooms, ketamine)  no  Eating disorder:  no  Gambling:              Yes, past        PAST PSYCHIATRIC HISTORY:  Diagnoses- PTSD, depression,anxiety  Suicide Attempts: No   Hospitalizations: No         8/22/2023     2:00 PM 9/5/2023     2:00 PM 9/18/2023     1:00 PM   PHQ   PHQ-9 Total Score 18 11 16   Q9: Thoughts of better off dead/self-harm past 2  weeks More than half the days Several days Several days       Social History  Housing status: in a sober house- [employer] - through [facility]  Employment status: Unemployed, seeking work  Relationship status: Single  Children: no children  Legal: parole 2025  Insurance needs: Medicaid  Contact information up to date? yes      Most recent Recovery Clinic visit: 9/5/23    A/P last visit:  Continued on Suboxone, 8-2mg TID    09/18/23 visit:  Ran out of Suboxone this morning  No side effects from Suboxone  Still has financial issues - working on getting CPRS certification, hoping to take exam next week  At [facility] for outpatient treatment, in sober living  Has psychiatrist through [facility], declines need for referral - mood has been low but staying active and motivated, + SI on PHQ9 - C-SSRS indicates no risk  Sleeping about 6 hours at night, feels rested  Appetite is good      Labs discussed with patient?  Yes      [place] Prescription Drug Monitoring Program Reviewed:  Yes; as expected    Medications:  cyproheptadine (PERIACTIN) 4 MG tablet, Take 4 mg by mouth 3 times daily as needed for allergies  OLANZapine (ZYPREXA) 5 MG tablet, Take 0.5 tablets (2.5 mg) by mouth every morning AND 1 tablet (5 mg) At Bedtime.  venlafaxine (EFFEXOR XR) 150 MG 24 hr capsule, Take 1 capsule (150 mg) by mouth daily  Vitamin D3 (CHOLECALCIFEROL) 25 mcg (1000 units) tablet, Take 1 tablet by mouth daily    No current facility-administered medications on file prior to visit.      No Known Allergies    PMH, PSH, FamHx reviewed      OBJECTIVE                                                      /89   Pulse 62     Physical Exam  Vitals and nursing note reviewed.   Constitutional:       General: He is not in acute distress.     Appearance: Normal appearance. He is not ill-appearing or diaphoretic.   HENT:      Head: Normocephalic and atraumatic.   Cardiovascular:      Rate and Rhythm: Normal rate.   Pulmonary:      Effort: Pulmonary  effort is normal. No respiratory distress.   Skin:     Coloration: Skin is not jaundiced or pale.   Neurological:      General: No focal deficit present.      Mental Status: He is alert and oriented to person, place, and time.      Gait: Gait normal.   Psychiatric:         Attention and Perception: Attention normal.         Mood and Affect: Mood is depressed. Affect is not inappropriate.         Speech: Speech normal.         Behavior: Behavior normal.         Thought Content: Thought content normal.      Comments: Judgment/insight fair         Labs:    UDS:    Lab Results   Component Value Date    BUP Screen Positive (A) 09/18/2023    BZO Negative 09/18/2023    BAR Negative 09/18/2023    CLIFFORD Negative 09/18/2023    MAMP Negative 09/18/2023    AMP Negative 09/18/2023    MDMA Negative 09/18/2023    MTD Negative 09/18/2023    [id number] Negative 09/18/2023    OXY Negative 09/18/2023    PCP Negative 09/18/2023    THC Negative 09/18/2023    TEMP 94 F 09/18/2023    SGPOCT 1.025 09/18/2023     *POC urine drug screen does not screen for Fentanyl      Recent Results (from the past 240 hour(s))   Drugs of Abuse Screen Urine (POC CUPS) POCT    Collection Time: 09/18/23  1:32 PM   Result Value Ref Range    POCT Kit Lot Number [id number]     POCT Kit Expiration Date 39859928     Temperature Urine POCT 94 F 90 F, 92 F, 94 F, 96 F, 98 F, 100 F    Specific [place] POCT 1.025 1.005, 1.015, 1.025    pH Qual Urine POCT 5 pH 4 pH, 5 pH, 7 pH, 9 pH    Creatinine Qual Urine POCT 50 mg/dL 20 mg/dL, 50 mg/dL, 100 mg/dL, 200 mg/dL    Internal QC Qual Urine POCT Valid Valid    Amphetamine Qual Urine POCT Negative Negative    Barbiturate Qual Urine POCT Negative Negative    Buprenorphine Qual Urine POCT Screen Positive (A) Negative    Benzodiazepine Qual Urine POCT Negative Negative    Cocaine Qual Urine POCT Negative Negative    Methamphetamine Qual Urine POCT Negative Negative    MDMA Qual Urine POCT Negative Negative    Methadone Qual Urine POCT  Negative Negative    Opiate Qual Urine POCT Negative Negative    Oxycodone Qual Urine POCT Negative Negative    Phencyclidine Qual Urine POCT Negative Negative    THC Qual Urine POCT Negative Negative         Bonita Rizzo DO  [facility]  [address] [place] [id number] [phone]

## 2023-09-19 LAB
HCV AB SERPL QL IA: REACTIVE
HIV 1+2 AB+HIV1 P24 AG SERPL QL IA: NONREACTIVE
T PALLIDUM AB SER QL: NONREACTIVE

## 2023-09-20 LAB
HBV CORE AB SERPL QL IA: NONREACTIVE
HBV SURFACE AB SERPL IA-ACNC: 45.19 M[IU]/ML
HBV SURFACE AB SERPL IA-ACNC: REACTIVE M[IU]/ML
HBV SURFACE AG SERPL QL IA: NONREACTIVE

## 2023-09-21 ENCOUNTER — TELEPHONE (OUTPATIENT)
Dept: BEHAVIORAL HEALTH | Facility: CLINIC | Age: 48
End: 2023-09-21
Payer: COMMERCIAL

## 2023-09-21 LAB — HCV RNA SERPL NAA+PROBE-ACNC: NOT DETECTED IU/ML

## 2023-09-21 NOTE — TELEPHONE ENCOUNTER
----- Message from Bonita Rizzo DO sent at 9/21/2023 12:54 PM CDT -----  Please reach out to Edgar with his lab results:    All labs came back negative for infection.  His hepatitis C antibody is positive but no virus detected - this indicates past infection, not current.      Thanks!    Bonita Rizzo DO on 9/21/2023 at 12:54 PM    Writer attempted to contact patient, neither number on file appear to be active to reach patient, nor have the ability to leave a message. Not active on [employer].     Krista Erickson, RN on 9/21/2023 at 1:37 PM

## 2023-09-25 ENCOUNTER — HOSPITAL ENCOUNTER (EMERGENCY)
Facility: CLINIC | Age: 48
Discharge: HOME | End: 2023-09-25
Payer: COMMERCIAL

## 2023-09-25 ENCOUNTER — APPOINTMENT (OUTPATIENT)
Dept: GENERAL RADIOLOGY | Facility: CLINIC | Age: 48
End: 2023-09-25
Attending: PHYSICIAN ASSISTANT
Payer: COMMERCIAL

## 2023-09-25 ENCOUNTER — HOSPITAL ENCOUNTER (EMERGENCY)
Facility: CLINIC | Age: 48
Discharge: HOME | End: 2023-09-25
Attending: EMERGENCY MEDICINE | Admitting: EMERGENCY MEDICINE
Payer: COMMERCIAL

## 2023-09-25 VITALS
TEMPERATURE: 98 F | HEART RATE: 93 BPM | RESPIRATION RATE: 18 BRPM | BODY MASS INDEX: 26.6 KG/M2 | OXYGEN SATURATION: 99 % | SYSTOLIC BLOOD PRESSURE: 117 MMHG | DIASTOLIC BLOOD PRESSURE: 73 MMHG | HEIGHT: 71 IN | WEIGHT: 190 LBS

## 2023-09-25 VITALS
TEMPERATURE: 98.5 F | DIASTOLIC BLOOD PRESSURE: 83 MMHG | HEART RATE: 64 BPM | BODY MASS INDEX: 26.6 KG/M2 | OXYGEN SATURATION: 99 % | HEIGHT: 71 IN | RESPIRATION RATE: 18 BRPM | WEIGHT: 190 LBS | SYSTOLIC BLOOD PRESSURE: 123 MMHG

## 2023-09-25 DIAGNOSIS — Z20.6 CONTACT W AND (SUSPECTED) EXPOSURE TO HUMAN IMMUNODEF VIRUS: ICD-10-CM

## 2023-09-25 DIAGNOSIS — S91.332A: Primary | ICD-10-CM
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
## 2023-09-25 DIAGNOSIS — Z23 NEED FOR POST EXPOSURE PROPHYLAXIS FOR HEPATITIS B: ICD-10-CM

## 2023-09-25 DIAGNOSIS — Z20.828 NEED FOR POST EXPOSURE PROPHYLAXIS FOR HEPATITIS B: ICD-10-CM
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
## 2023-09-25 LAB
ALBUMIN SERPL BCG-MCNC: 4.6 G/DL (ref 3.5–5.2)
ALP SERPL-CCNC: 101 U/L (ref 40–129)
ALT SERPL W P-5'-P-CCNC: 20 U/L (ref 0–70)
ANION GAP SERPL CALCULATED.3IONS-SCNC: 15 MMOL/L (ref 7–15)
AST SERPL W P-5'-P-CCNC: 27 U/L (ref 0–45)
BILIRUB SERPL-MCNC: 0.5 MG/DL
BUN SERPL-MCNC: 7.9 MG/DL (ref 6–20)
CALCIUM SERPL-MCNC: 9 MG/DL (ref 8.6–10)
CHLORIDE SERPL-SCNC: 103 MMOL/L (ref 98–107)
CREAT SERPL-MCNC: 0.74 MG/DL (ref 0.67–1.17)
DEPRECATED HCO3 PLAS-SCNC: 25 MMOL/L (ref 22–29)
EGFRCR SERPLBLD CKD-EPI 2021: >90 ML/MIN/1.73M2
GLUCOSE SERPL-MCNC: 149 MG/DL (ref 70–99)
POTASSIUM SERPL-SCNC: 3.6 MMOL/L (ref 3.4–5.3)
PROT SERPL-MCNC: 7.8 G/DL (ref 6.4–8.3)
SODIUM SERPL-SCNC: 143 MMOL/L (ref 136–145)

## 2023-09-25 PROCEDURE — 250N000013 HC RX MED GY IP 250 OP 250 PS 637: Performed by: PHYSICIAN ASSISTANT

## 2023-09-25 PROCEDURE — 86706 HEP B SURFACE ANTIBODY: CPT | Performed by: PHYSICIAN ASSISTANT

## 2023-09-25 PROCEDURE — 80053 COMPREHEN METABOLIC PANEL: CPT | Performed by: PHYSICIAN ASSISTANT

## 2023-09-25 PROCEDURE — 86705 HEP B CORE ANTIBODY IGM: CPT | Performed by: PHYSICIAN ASSISTANT

## 2023-09-25 PROCEDURE — 87340 HEPATITIS B SURFACE AG IA: CPT | Performed by: PHYSICIAN ASSISTANT

## 2023-09-25 PROCEDURE — 90715 TDAP VACCINE 7 YRS/> IM: CPT | Performed by: PHYSICIAN ASSISTANT

## 2023-09-25 PROCEDURE — 99284 EMERGENCY DEPT VISIT MOD MDM: CPT | Mod: 25

## 2023-09-25 PROCEDURE — 90471 IMMUNIZATION ADMIN: CPT | Performed by: PHYSICIAN ASSISTANT

## 2023-09-25 PROCEDURE — 99284 EMERGENCY DEPT VISIT MOD MDM: CPT | Performed by: EMERGENCY MEDICINE

## 2023-09-25 PROCEDURE — 73630 X-RAY EXAM OF FOOT: CPT | Mod: LT

## 2023-09-25 PROCEDURE — 250N000011 HC RX IP 250 OP 636: Performed by: PHYSICIAN ASSISTANT

## 2023-09-25 PROCEDURE — 99283 EMERGENCY DEPT VISIT LOW MDM: CPT | Mod: 27 | Performed by: PHYSICIAN ASSISTANT

## 2023-09-25 PROCEDURE — 36415 COLL VENOUS BLD VENIPUNCTURE: CPT | Performed by: PHYSICIAN ASSISTANT

## 2023-09-25 PROCEDURE — 87389 HIV-1 AG W/HIV-1&-2 AB AG IA: CPT | Performed by: PHYSICIAN ASSISTANT

## 2023-09-25 RX ORDER — EMTRICITABINE AND TENOFOVIR DISOPROXIL FUMARATE 200; 300 MG/1; MG/1
1 TABLET, FILM COATED ORAL ONCE
Status: COMPLETED | OUTPATIENT
Start: 2023-09-25 | End: 2023-09-25

## 2023-09-25 RX ORDER — CIPROFLOXACIN 500 MG/1
500 TABLET, FILM COATED ORAL 2 TIMES DAILY
Qty: 14 TABLET | Refills: 0 | Status: SHIPPED | OUTPATIENT
Start: 2023-09-25 | End: 2023-10-02

## 2023-09-25 RX ORDER — ACETAMINOPHEN 500 MG
1000 TABLET ORAL ONCE
Status: COMPLETED | OUTPATIENT
Start: 2023-09-25 | End: 2023-09-25

## 2023-09-25 RX ORDER — EMTRICITABINE AND TENOFOVIR DISOPROXIL FUMARATE 200; 300 MG/1; MG/1
1 TABLET, FILM COATED ORAL DAILY
Status: DISCONTINUED | OUTPATIENT
Start: 2023-09-26 | End: 2023-09-25

## 2023-09-25 RX ORDER — CIPROFLOXACIN 500 MG/1
500 TABLET, FILM COATED ORAL ONCE
Status: COMPLETED | OUTPATIENT
Start: 2023-09-25 | End: 2023-09-25

## 2023-09-25 RX ORDER — EMTRICITABINE AND TENOFOVIR DISOPROXIL FUMARATE 200; 300 MG/1; MG/1
1 TABLET, FILM COATED ORAL DAILY
Qty: 28 TABLET | Refills: 0 | Status: SHIPPED | OUTPATIENT
Start: 2023-09-25 | End: 2024-03-08

## 2023-09-25 RX ADMIN — EMTRICITABINE AND TENOFOVIR DISOPROXIL FUMARATE 1 TABLET: 200; 300 TABLET, FILM COATED ORAL at 22:17

## 2023-09-25 RX ADMIN — CLOSTRIDIUM TETANI TOXOID ANTIGEN (FORMALDEHYDE INACTIVATED), CORYNEBACTERIUM DIPHTHERIAE TOXOID ANTIGEN (FORMALDEHYDE INACTIVATED), BORDETELLA PERTUSSIS TOXOID ANTIGEN (GLUTARALDEHYDE INACTIVATED), BORDETELLA PERTUSSIS FILAMENTOUS HEMAGGLUTININ ANTIGEN (FORMALDEHYDE INACTIVATED), BORDETELLA PERTUSSIS PERTACTIN ANTIGEN, AND BORDETELLA PERTUSSIS FIMBRIAE 2/3 ANTIGEN 0.5 ML: 5; 2; 2.5; 5; 3; 5 INJECTION, SUSPENSION INTRAMUSCULAR at 16:55

## 2023-09-25 RX ADMIN — DOLUTEGRAVIR SODIUM 50 MG: 50 TABLET, FILM COATED ORAL at 22:17

## 2023-09-25 RX ADMIN — CIPROFLOXACIN HYDROCHLORIDE 500 MG: 500 TABLET, FILM COATED ORAL at 22:17

## 2023-09-25 RX ADMIN — ACETAMINOPHEN 1000 MG: 500 TABLET ORAL at 17:59

## 2023-09-25 ASSESSMENT — ACTIVITIES OF DAILY LIVING (ADL)
ADLS_ACUITY_SCORE: 35
ADLS_ACUITY_SCORE: 33
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
## 2023-09-25 NOTE — ED PROVIDER NOTES
"ED Provider Note  [facility]      History     Chief Complaint   Patient presents with    Needle Stick     [place] went [place] the soul of his shoe.     HPI  47yo M pmhx opioid use disorder presents for evaluation s/p stepping on a presumed used needle. 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 Reports needle punctured through shoe into left heel. Unsure if blood was drawn. Did wash area with soap and water. Feels like there could be a retained FB. Unsure of last Tdap. Does not know if he is Hep B vaccinated.        A medically appropriate review of systems was performed with pertinent positives and negatives noted in the HPI, and all other systems negative.    Physical Exam   BP: 117/73  Pulse: 93  Temp: 98  F (36.7  C)  Resp: 18  Height: 180.3 cm (5' 11\")  Weight: 86.2 kg (190 lb)  SpO2: 99 %  Physical Exam  Constitutional:       General: He is not in acute distress.     Appearance: He is well-developed. He is not diaphoretic.   HENT:      Head: Normocephalic and atraumatic.   Eyes:      General: No scleral icterus.  Musculoskeletal:      Cervical back: Normal range of motion and neck supple.        Feet:    Skin:     General: Skin is warm and dry.      Findings: No rash.   Neurological:      Mental Status: He is alert and oriented to person, place, and time.           ED Course, Procedures, & Data      Procedures                     Results for orders placed or performed during the hospital encounter of 09/25/23   Foot XR, G/E 3 views, left     Status: None    Narrative    FOOT THREE VIEWS LEFT  9/25/2023 4:27 PM     HISTORY: R/o retained FB of heel  COMPARISON: None.      Impression    IMPRESSION: No definite radiopaque foreign body is identified. No  acute fracture or malalignment. Mild first MTP and first IP joint  degenerative changes.    MARQUISE PA MD         SYSTEM ID:  [id number]     Medications "   Tdap (tetanus-diphtheria-acell pertussis) (ADACEL) injection 0.5 mL (has no administration in time range)     Labs Ordered and Resulted from Time of ED Arrival to Time of ED Departure - No data to display  Foot XR, G/E 3 views, left   Final Result   IMPRESSION: No definite radiopaque foreign body is identified. No   acute fracture or malalignment. Mild first MTP and first IP joint   degenerative changes.      MARQUISE PA MD            SYSTEM ID:  [id number]             Critical care was not performed.     Medical Decision Making  The patient's presentation was of high complexity (an acute health issue posing potential threat to life or bodily function).    The patient's evaluation involved:  ordering and/or review of 3+ test(s) in this encounter (see separate area of note for details)  independent interpretation of testing performed by another health professional (xray)    The patient's management necessitated moderate risk (prescription drug management including medications given in the ED).    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 Area washed with soap and water by pt. He is unsure of his Tdap or Hep B vaccination status. Exam shows a small wound to heel of left foot. A xray was obtained to f/o retained FB and negative. Pt's Tdap was updated today. He was tested for HIV so as to initiate PEP, with result pending..     Was unable to find any information on pt's Hep B vaccination status and pt does not know, and as such to assess pt's immune status Hep B series was drawn for follow.     Plan had been to empirically prophylaxis today with Hep B immunoglobulin and Hep B vaccine given unknown vaccine/immune status, ppx against HIV with PEP x 28 days, and  given puncture wound through sole of shoe, ppx  with ciprofloxacin. However, pt eloped prior to any of this. I called pt  x2 to encourage him to return to the ER, however, pt did not return. I called him again to tell him that I had sent script for Truvada, dolutegravir and cipro to our pharmacy, and to encourage him to follow up with his PCP ASAP or return to the ER for Hep B ppx. Also informed of need for repeat HIV testing.       I have reviewed the nursing notes. I have reviewed the findings, diagnosis, plan and need for follow up with the patient.    New Prescriptions    No medications on file       Final diagnoses:   Accidental hypodermic needlestick injury   Need for post exposure prophylaxis for hepatitis B   Contact w and (suspected) exposure to human immunodef virus       Isma Lau PA-C  [facility] EMERGENCY DEPARTMENT  9/25/2023    =========    --    ED Attending Physician Attestation    I Gregg Kelley MD, cared for this patient with the Advanced Practice Provider (KINDRA). I have performed a history and physical examination of the patient independent of the KINDRA. I reviewed the KINDRA's documentation above and agree with the documented findings and plan of care. I personally provided a substantive portion of the care for this patient, including the complete Medical Decision Making. Please see the KINDRA's documentation for full details.    Summary of HPI, PE, ED Course   Patient is a 48 year old male evaluated in the emergency department for high risk needlestick through sole of shoe yesterday while at a homeless encampment.   Left shoe/foot    Exam and ED course notable for left heel puncture wound without surrounding erythema or drainage.     After the completion of care in the emergency department, the patient was discharged.        Medical Decision Making  Assessment and plan: High risk needlestick through shoe   -Update tetanus if necessary, x-ray to rule out foreign body in foot   - Postexposure prophylaxis for HIV and hepatitis   - Screening with baseline labs with HIV hepatitis panel, hepatitis vaccine  and antibodies, per up-to-date high risk protocol    We will arrange primary care follow-up for the patient and discharge as long as patient able to tolerate p.o. post exposure prophylaxis        Gregg Kelley MD  Emergency Medicine        Isma Lau PA-C  09/25/23 2005       Isma Lau PA-C  09/25/23 2008       Gregg Kelley MD  09/27/23 2007

## 2023-09-25 NOTE — ED NOTES
Left before hep b injections were administered. Did not want to wait due to a [place] meeting they had to go to. Pt was educated that they can go to PCP and received their injection there.

## 2023-09-26 LAB
HBV CORE IGM SERPL QL IA: NONREACTIVE
HBV SURFACE AB SERPL IA-ACNC: 49.58 M[IU]/ML
HBV SURFACE AB SERPL IA-ACNC: REACTIVE M[IU]/ML
HBV SURFACE AG SERPL QL IA: NONREACTIVE
HIV 1+2 AB+HIV1 P24 AG SERPL QL IA: NONREACTIVE

## 2023-09-26 NOTE — ED PROVIDER NOTES
"ED Provider Note  [facility]      History     Chief Complaint   Patient presents with    Needle Stick     Seen earlier today for needle stick, left and did not receive medications. Pt back to get Hep B vaccine     HPI  49yo M returns after been seen earlier today by me for needle stick. Pt had eloped prior to receiving Hep B vaccinations, as could not find vaccination status. However, further chart review now shows that he had a positive Hep B surface antigen, in the immune level, indicating immunity to Hep B. HIV PEP and prophylaxis for puncture through shoe sent to pharmacy, though as is now after hours, will give first dose here tonight.          A medically appropriate review of systems was performed with pertinent positives and negatives noted in the HPI, and all other systems negative. and A complete review of systems was performed with pertinent positives and negatives noted in the HPI, and all other systems negative.    Physical Exam   BP: 123/83  Pulse: 64  Temp: 98.5  F (36.9  C)  Resp: 18  Height: 180.3 cm (5' 11\")  Weight: 86.2 kg (190 lb)  SpO2: 99 %  Physical Exam  Constitutional:       General: He is not in acute distress.     Appearance: He is well-developed. He is not diaphoretic.   HENT:      Head: Normocephalic and atraumatic.   Eyes:      General: No scleral icterus.  Musculoskeletal:      Cervical back: Normal range of motion and neck supple.   Skin:     General: Skin is warm and dry.      Findings: No rash.   Neurological:      Mental Status: He is alert and oriented to person, place, and time.           ED Course, Procedures, & Data      Procedures            Results for orders placed or performed during the hospital encounter of 09/25/23   Foot XR, G/E 3 views, left     Status: None    Narrative    FOOT THREE VIEWS LEFT  9/25/2023 4:27 PM     HISTORY: R/o retained FB of heel  COMPARISON: None.      Impression    IMPRESSION: No definite radiopaque foreign body is " identified. No  acute fracture or malalignment. Mild first MTP and first IP joint  degenerative changes.    MARQUISE PA MD         SYSTEM ID:  [id number]   Comprehensive metabolic panel     Status: Abnormal   Result Value Ref Range    Sodium 143 136 - 145 mmol/L    Potassium 3.6 3.4 - 5.3 mmol/L    Carbon Dioxide (CO2) 25 22 - 29 mmol/L    Anion Gap 15 7 - 15 mmol/L    Urea Nitrogen 7.9 6.0 - 20.0 mg/dL    Creatinine 0.74 0.67 - 1.17 mg/dL    GFR Estimate >90 >60 mL/min/1.73m2    Calcium 9.0 8.6 - 10.0 mg/dL    Chloride 103 98 - 107 mmol/L    Glucose 149 (H) 70 - 99 mg/dL    Alkaline Phosphatase 101 40 - 129 U/L    AST 27 0 - 45 U/L    ALT 20 0 - 70 U/L    Protein Total 7.8 6.4 - 8.3 g/dL    Albumin 4.6 3.5 - 5.2 g/dL    Bilirubin Total 0.5 <=1.2 mg/dL     Medications - No data to display  Labs Ordered and Resulted from Time of ED Arrival to Time of ED Departure - No data to display  No orders to display          Critical care was not performed.     Medical Decision Making  The patient's presentation was of moderate complexity (an undiagnosed new problem with uncertain diagnosis).    The patient's evaluation involved:  review of external note(s) from 1 sources (earlier note)    The patient's management necessitated moderate risk (prescription drug management including medications given in the ED).    Assessment & Plan    47yo M returns after been seen earlier today by me for needle stick. Pt had eloped prior to receiving Hep B vaccinations, as could not find vaccination status. However, further chart review now shows that he had a positive Hep B surface antigen, in the immune level, indicating immunity to Hep B. HIV PEP and prophylaxis for puncture through shoe sent to pharmacy after previous visit, though as is now after hours, will give first dose here tonight, with instructions to continue tomorrow, and follow up with PCP for repeat HIV testing.         I have reviewed the nursing notes. I have reviewed the  findings, diagnosis, plan and need for follow up with the patient.    New Prescriptions    No medications on file       Final diagnoses:   Accident caused by hypodermic needle, subsequent encounter         Isma Lau PA-C  [facility] EMERGENCY DEPARTMENT  9/25/2023     Isma Lau PA-C  09/25/23 [id number]       Isma Lau PA-C  09/25/23 [id number]

## 2023-09-26 NOTE — DISCHARGE INSTRUCTIONS
Please make an appointment to follow up with Your Primary Care Provider and Primary Care Center (phone: [phone]) in 28 days for repeat HIV testing.

## 2023-10-01 ENCOUNTER — TELEPHONE (OUTPATIENT)
Dept: EMERGENCY MEDICINE | Facility: CLINIC | Age: 48
End: 2023-10-01
Payer: COMMERCIAL

## 2023-10-01 NOTE — TELEPHONE ENCOUNTER
[facility] Emergency Department/Urgent Care Lab result notification  [Note:  ED Lab Results RN will reference the [facility] Emergency Dept visit note prior to contacting patient AND/OR prior to consulting Emergency Dept Provider.  Highlights of Emergency Dept visit in information summary at the bottom of this telephone note]    1. Reason for call  Notify of lab results  Assess patient symptoms [if necessary]  Review ED Providers recommendations/discharge instructions (if necessary)  Advise per [facility] ED lab result protocol    2. Lab Result (including Rx patient on, if applicable).  If culture, copy of lab report at bottom.  Component      Latest Ref Rng 9/25/2023  4:57 PM   Hepatitis B Surface Antibody Instrument Value      <8.00 m[IU]/mL 49.58    Hepatitis B Surface Antibody Reactive    Hep B Surface Agn      Nonreactive  Nonreactive    Hepatitis B Core IgM      Nonreactive  Nonreactive    HIV Antigen Antibody Combo      Nonreactive  Nonreactive          3. RN Assessment (Patient's current Symptoms):  Time of call: 1:45P    4. RN Recommendations/Instructions per [facility] ED lab result protocol  [facility] ED lab result protocol used: Blood exposure  Edgar was notified of lab result and treatment recommendations.    Reviewed AVS discharge instructions      Colten Oseguera RN  [facility]  Emergency Dept Lab Result RN  Ph# [phone]

## 2023-10-18 ENCOUNTER — TELEPHONE (OUTPATIENT)
Dept: BEHAVIORAL HEALTH | Facility: CLINIC | Age: 48
End: 2023-10-18
Payer: COMMERCIAL

## 2023-10-18 NOTE — TELEPHONE ENCOUNTER
Spoke with patient.  He did not realize he had an appointment but will reschedule or walk-in a little later this week.

## 2023-10-23 ENCOUNTER — OFFICE VISIT (OUTPATIENT)
Dept: BEHAVIORAL HEALTH | Facility: CLINIC | Age: 48
End: 2023-10-23
Payer: COMMERCIAL

## 2023-10-23 VITALS — HEART RATE: 66 BPM | SYSTOLIC BLOOD PRESSURE: 156 MMHG | DIASTOLIC BLOOD PRESSURE: 99 MMHG
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
## 2023-10-23 DIAGNOSIS — F11.20 OPIOID USE DISORDER, SEVERE, DEPENDENCE (H): ICD-10-CM

## 2023-10-23 DIAGNOSIS — F33.41 RECURRENT MAJOR DEPRESSIVE DISORDER, IN PARTIAL REMISSION (H): ICD-10-CM

## 2023-10-23 DIAGNOSIS — F43.23 ADJUSTMENT DISORDER WITH MIXED ANXIETY AND DEPRESSED MOOD: Primary | ICD-10-CM

## 2023-10-23 LAB
AMPHETAMINE QUAL URINE POCT: NEGATIVE
BARBITURATE QUAL URINE POCT: NEGATIVE
BENZODIAZEPINE QUAL URINE POCT: NEGATIVE
BUPRENORPHINE QUAL URINE POCT: NEGATIVE
COCAINE QUAL URINE POCT: NEGATIVE
CREATININE QUAL URINE POCT: NEGATIVE
INTERNAL QC QUAL URINE POCT: ABNORMAL
MDMA QUAL URINE POCT: NEGATIVE
METHADONE QUAL URINE POCT: NEGATIVE
METHAMPHETAMINE QUAL URINE POCT: NEGATIVE
OPIATE QUAL URINE POCT: NEGATIVE
OXYCODONE QUAL URINE POCT: NEGATIVE
PH QUAL URINE POCT: ABNORMAL
PHENCYCLIDINE QUAL URINE POCT: NEGATIVE
POCT KIT EXPIRATION DATE: ABNORMAL
POCT KIT LOT NUMBER: ABNORMAL
SPECIFIC GRAVITY POCT: 1.01
TEMPERATURE URINE POCT: ABNORMAL
THC QUAL URINE POCT: NEGATIVE

## 2023-10-23 PROCEDURE — 99214 OFFICE O/P EST MOD 30 MIN: CPT | Performed by: FAMILY MEDICINE

## 2023-10-23 RX ORDER — BUPRENORPHINE AND NALOXONE 8; 2 MG/1; MG/1
3 FILM, SOLUBLE BUCCAL; SUBLINGUAL DAILY
Qty: 30 FILM | Refills: 0 | Status: SHIPPED | OUTPATIENT
Start: 2023-10-23 | End: 2023-11-22

## 2023-10-23 ASSESSMENT — PATIENT HEALTH QUESTIONNAIRE - PHQ9: SUM OF ALL RESPONSES TO PHQ QUESTIONS 1-9: 19

## 2023-10-23 ASSESSMENT — PAIN SCALES - GENERAL: PAINLEVEL: NO PAIN (0)

## 2023-10-23 NOTE — NURSING NOTE
[facility]      Rooming information:  Approximate last use of full opioid agonist: about 10 days ago Fentanyl- smoked it    Taking buprenorphine? Yes:   How much per day? 24 mg  Number of buprenorphine films/tablets remaining currently: none  Side effects related to buprenorphine (constipation, dry mouth, sedation?) No \  Narcan currently available: Yes  Other recent substance use:    Denies  NICOTINE-Yes: Cigarettes  If using nicotine, ready to quit? Yes:     Point of care urine drug screen positive for:  Lab Results   Component Value Date    BUP Negative 10/23/2023    BZO Negative 10/23/2023    BAR Negative 10/23/2023    CLIFFORD Negative 10/23/2023    MAMP Negative 10/23/2023    AMP Negative 10/23/2023    MDMA Negative 10/23/2023    MTD Negative 10/23/2023    WDU378 Negative 10/23/2023    OXY Negative 10/23/2023    PCP Negative 10/23/2023    THC Negative 10/23/2023    TEMP 92 F 10/23/2023    SGPOCT 1.015 10/23/2023       *[facility] urine drug screen does not screen for Fentanyl        10/23/2023    12:00 PM   PHQ Assesment Total Score(s)   PHQ-9 Score 20       If PHQ-9 score of 15 or higher, has [facility] therapist or provider been notified? No    Any current suicidal ideation? No  If yes, has [facility] therapist or provider been notified? Yes    Primary care provider: Physician No Ref-Primary     Mental health provider: Yes (follow up on MH referral if needed)    Insurance needs: Active    Housing needs: Sober house    Current legal issues: no    Contact information up to date? yes    3rd Party Involvement  (please obtain RAMIRO if pt would like to include)    Dianne Craven MA  October 23, 2023  12:45 PM

## 2023-10-23 NOTE — PROGRESS NOTES
The patient scored elevated on the PHQ-9 and was going to completed the [place] and as we talked about the suicidal question on the PHQ-9 he stated that he miss understood the question so this writer read the question to the patient and he stated that he has not thought of self harm and no thought of suicidal or want to die.  So, this writer changed his answer and we did not complete the [place].

## 2023-10-23 NOTE — PATIENT INSTRUCTIONS
Resume Suboxone 8-2mg film three times per day.    Continue at [facility].    Follow-up in 10 days, sooner if needed.      Buprenorphine Tips:  Make sure you are letting your buprenorphine tablets or films dissolve completely under your tongue so you know you are getting all the medication.  Don't eat, drink, or talk while taking your buprenorphine.  Avoid nicotine for 15-30 minutes before taking buprenorphine - this can cause the blood vessels to constrict and you may not absorb the medication as well.  To avoid potential dental issues related to buprenorphine rinse your mouth with water after taking your dose.  Avoid brushing your teeth for 1 hour after taking a dose.     Constipation is the most common side effect of buprenorphine.  Here are some simple things you can try to ease constipation.  Eating more fiber (fruits, vegetable, and whole grains) and drinking enough fluid (urine should be light yellow).    Take Miralax (polyethylene gylcol).  Use 1 capful daily until you start to have loose stools and then decrease use.  You can also try Colace (docusate) 100mg twice daily as needed.  These medications can be prescribed or purchased OTC.  Let your provider know if you are still struggling with constipation.    What to do if you experience nausea or upset stomach after taking buprenorphine:  Make sure you are letting it dissolve completely.  After the medication has dissolved try spitting out your saliva instead of swallowing it.    Other common side effects include:   - sleepiness/drowsiness OR trouble sleeping  - headaches    Please discuss any side effects with your provider.    Important safety info:  Ensure your medication is stored in a safe place out of sight and out of reach from kids and pets, ideally locked away.   Do not combine buprenorphine with other sedating substances or medications such as alcohol, benzodiazepines (Xanax or alprazolam for example), or other opioids.  Only take medications as  prescribed.

## 2023-10-23 NOTE — PROGRESS NOTES
[facility] Follow Up    ASSESSMENT/PLAN                                                      1. Opioid use disorder, severe, dependence (H)  Reviewed recent one time return to use, reflected on risks and he is thankful to have not experience harm.  Encouraged him to continue Suboxone 8-2mg TID. Encouraged him to continue programming at [facility].     - buprenorphine HCl-naloxone HCl (SUBOXONE) 8-2 MG per film; Place 3 Film under the tongue daily  Dispense: 30 Film; Refill: 0    2. Accident caused by hypodermic needle, subsequent encounter  Reviewed his recent needle stick.  Discussed recommendation to recheck HIV status.  - HIV Antigen Antibody Combo; Future      Return in about 10 days (around 11/2/2023) for Follow up, in person.    Patient counseling completed today:  Discussed mechanism of action, potential risks/benefits/side effects of medications and other recommendations above.     Harm reduction counseling including never use alone, availability of naloxone, risks associated with concurrent use of opioids and benzodiazepines, alcohol, or other sedatives, safer administration as applicable.  Discussed importance of avoiding isolation, building a network of supportive relationships, avoiding people/places/things associated with past use to reduce risk of relapse; including motivational interviewing regarding psychosocial interventions.     SUBJECTIVE                                                          CC/HPI:  Rm Merchant is a 48 year old male with PMH MDD, polysubstance use disorder including opioids on buprenorphine who presents to the Recovery Clinic for return visit.       Brief History:  Rm Merchant was first seen in Recovery Clinic on 08/10/23. They were referred by staff at [facility] due to difficulties filling his most recent rx for buprenorphine through [facility].        Substance Use History :  Opioids:   Age at first use: 26 years  heroin, use increased to daily while incarcerated x21 years.  Released Spring 2023.   Current use: substance: fentanyl; quantity alot; route: insufflation ; timing of last use: - 10/3/23                IV drug use: No   History of overdose: No  Previous residential or outpatient treatments for addiction : Yes: [facility] [facility] 6/7/23 completed residential then started OP  Previous medication treatments for addiction: Yes: suboxone  Longest period of sobriety: 2012 to present  Medical complications related to substance use: No  Hepatitis C: pt report neg; Date of most recent testing: no record  HIV: pt report neg; Date of most recent testing: no record     Other Addiction History:  Stimulants (cocaine, methamphetamine, MDMA/ecstasy)   Cocaine 13 years ago  Sedatives/hypnotics/anxiolytics: (benzodiazepines, GHB, Ambien, phenobarbital)  no  Alcohol:   past  Nicotine: (cigarettes, vaping, chew/snuff)  Yes, cigarettes  Cannabis:   past  Hallucinogens/Dissociatives: (acid, mushrooms, ketamine)  no  Eating disorder:  no  Gambling:              Yes, past        PAST PSYCHIATRIC HISTORY:  Diagnoses- PTSD, depression,anxiety  Suicide Attempts: No   Hospitalizations: No         9/5/2023     2:00 PM 9/18/2023     1:00 PM 10/23/2023    12:00 PM   PHQ   PHQ-9 Total Score 11 16 19   Q9: Thoughts of better off dead/self-harm past 2 weeks Several days Several days Not at all       Social History  Housing status: in a sober house- [employer] - through [address]  Employment status: Unemployed, seeking work  Relationship status: Single  Children: no children  Legal: parole 2025  Insurance needs: Medicaid  Contact information up to date? yes      Most recent Recovery Clinic visit: 9/18/23    A/P last visit:  1. Opioid use disorder, severe, dependence (H)  Reports symptoms are well controlled, continue Suboxone 8-2mg TID.  He would like to come off buprenorphine in the future but no plans to make changes now.  Has Narcan.  Encouraged continued  programming at [facility], encouraged plan to become CPRS.    - buprenorphine HCl-naloxone HCl (SUBOXONE) 8-2 MG per film; Place 3 Film under the tongue daily  Dispense: 90 Film; Refill: 0  - Fentanyl Qualitative with Reflex to Quant Urine; Future  - Fentanyl Qualitative with Reflex to Quant Urine     2. Encounter for monitoring opioid maintenance therapy  - Drugs of Abuse Screen Urine (POC CUPS) POCT  - Drugs of Abuse Screen Urine (POC CUPS) POCT     3. Recurrent major depressive disorder, in partial remission (H)  Overall reports mood has been more positive, feeling low when ran out of meds.  Reports working with psychiatrist through [facility], declines additional referral.  Met with clinic therapist for C-SSRS and was no risk.                  Return in about 4 weeks (around 10/16/2023)      10/23/23 visit:  Return to use - last use 10 days ago - used after he found out he lost a friend to overdose, was drinking when he used  Withdrawal was terrible - took 6 films of Suboxone day after to overcome withdrawal, felt poorly for a few days  Last dose of Suboxone was a few days ago, taking 8-2mg TID, no side effects  Feels the Suboxone was helpful in being able to stop   Got his drivers license and looking for jobs  Continues at [facility]  Continues working with psychiatrist  Working on becoming on CPRS  Needle stick - received PEP on 9/25 - did not fill script for ongoing therapy  Sleeping OK, appetite is good  Has [place], completed training, used Narcan on someone about 2 weeks ago      Labs discussed with patient?  Yes      [place] Prescription Drug Monitoring Program Reviewed:  Yes; as expected    Medications:  cyproheptadine (PERIACTIN) 4 MG tablet, Take 4 mg by mouth 3 times daily as needed for allergies  dolutegravir (TIVICAY) 50 MG tablet, Take 1 tablet (50 mg) by mouth daily  emtricitabine-tenofovir (TRUVADA) 200-300 MG per tablet, Take 1 tablet by mouth daily  OLANZapine (ZYPREXA) 5 MG tablet, Take 0.5 tablets  (2.5 mg) by mouth every morning AND 1 tablet (5 mg) At Bedtime.  venlafaxine (EFFEXOR XR) 150 MG 24 hr capsule, Take 1 capsule (150 mg) by mouth daily  Vitamin D3 (CHOLECALCIFEROL) 25 mcg (1000 units) tablet, Take 1 tablet by mouth daily    No current facility-administered medications on file prior to visit.      No Known Allergies    PMH, PSH, FamHx reviewed      OBJECTIVE                                                      BP (!) 156/99 (BP Location: Right arm, Patient Position: Sitting, Cuff Size: Adult Regular)   Pulse 66     Physical Exam  Vitals and nursing note reviewed.   Constitutional:       General: He is not in acute distress.     Appearance: Normal appearance. He is not ill-appearing or diaphoretic.   HENT:      Head: Normocephalic and atraumatic.   Cardiovascular:      Rate and Rhythm: Normal rate.   Pulmonary:      Effort: Pulmonary effort is normal. No respiratory distress.   Skin:     Coloration: Skin is not jaundiced or pale.   Neurological:      General: No focal deficit present.      Mental Status: He is alert and oriented to person, place, and time.      Gait: Gait normal.   Psychiatric:         Attention and Perception: Attention normal.         Mood and Affect: Mood is depressed. Affect is not inappropriate.         Speech: Speech normal.         Behavior: Behavior normal.         Thought Content: Thought content normal.      Comments: Judgment/insight fair         Labs:    UDS:    Lab Results   Component Value Date    BUP Negative 10/23/2023    BZO Negative 10/23/2023    BAR Negative 10/23/2023    CLIFFORD Negative 10/23/2023    MAMP Negative 10/23/2023    AMP Negative 10/23/2023    MDMA Negative 10/23/2023    MTD Negative 10/23/2023    [id number] Negative 10/23/2023    OXY Negative 10/23/2023    PCP Negative 10/23/2023    THC Negative 10/23/2023    TEMP 92 F 10/23/2023    SGPOCT 1.015 10/23/2023     *POC urine drug screen does not screen for Fentanyl      Recent Results (from the past 240 hour(s))    Drugs of Abuse Screen Urine (POC CUPS) POCT    Collection Time: 10/23/23 12:55 PM   Result Value Ref Range    POCT Kit Lot Number [id number]     POCT Kit Expiration Date 2025-05-16     Temperature Urine POCT 92 F 90 F, 92 F, 94 F, 96 F, 98 F, 100 F    Specific [place] POCT 1.015 1.005, 1.015, 1.025    pH Qual Urine POCT 7 pH 4 pH, 5 pH, 7 pH, 9 pH    Creatinine Qual Urine POCT Negative (A) 20 mg/dL, 50 mg/dL, 100 mg/dL, 200 mg/dL    Internal QC Qual Urine POCT Valid Valid    Amphetamine Qual Urine POCT Negative Negative    Barbiturate Qual Urine POCT Negative Negative    Buprenorphine Qual Urine POCT Negative Negative    Benzodiazepine Qual Urine POCT Negative Negative    Cocaine Qual Urine POCT Negative Negative    Methamphetamine Qual Urine POCT Negative Negative    MDMA Qual Urine POCT Negative Negative    Methadone Qual Urine POCT Negative Negative    Opiate Qual Urine POCT Negative Negative    Oxycodone Qual Urine POCT Negative Negative    Phencyclidine Qual Urine POCT Negative Negative    THC Qual Urine POCT Negative Negative         Cynthia Rizzo DO  Addiction Medicine  [address] [id number] [phone]

## 2023-11-22 ENCOUNTER — OFFICE VISIT (OUTPATIENT)
Dept: BEHAVIORAL HEALTH | Facility: CLINIC | Age: 48
End: 2023-11-22
Payer: COMMERCIAL

## 2023-11-22 VITALS — HEART RATE: 90 BPM | DIASTOLIC BLOOD PRESSURE: 87 MMHG | SYSTOLIC BLOOD PRESSURE: 147 MMHG

## 2023-11-22 DIAGNOSIS — Z51.81 ENCOUNTER FOR MONITORING OPIOID MAINTENANCE THERAPY: ICD-10-CM

## 2023-11-22 DIAGNOSIS — F11.20 OPIOID USE DISORDER, SEVERE, DEPENDENCE (H): Primary | ICD-10-CM

## 2023-11-22 DIAGNOSIS — Z79.891 ENCOUNTER FOR MONITORING OPIOID MAINTENANCE THERAPY: ICD-10-CM

## 2023-11-22 DIAGNOSIS — F33.41 RECURRENT MAJOR DEPRESSIVE DISORDER, IN PARTIAL REMISSION (H): ICD-10-CM

## 2023-11-22 DIAGNOSIS — R69 DIAGNOSIS DEFERRED: Primary | ICD-10-CM

## 2023-11-22 DIAGNOSIS — F11.20 OPIOID USE DISORDER, SEVERE, DEPENDENCE (H): ICD-10-CM

## 2023-11-22 DIAGNOSIS — F43.23 ADJUSTMENT DISORDER WITH MIXED ANXIETY AND DEPRESSED MOOD: Primary | ICD-10-CM

## 2023-11-22 LAB
AMPHETAMINE QUAL URINE POCT: NEGATIVE
BARBITURATE QUAL URINE POCT: NEGATIVE
BENZODIAZEPINE QUAL URINE POCT: NEGATIVE
BUPRENORPHINE QUAL URINE POCT: NEGATIVE
COCAINE QUAL URINE POCT: NEGATIVE
CREATININE QUAL URINE POCT: NORMAL
INTERNAL QC QUAL URINE POCT: NORMAL
MDMA QUAL URINE POCT: NEGATIVE
METHADONE QUAL URINE POCT: NEGATIVE
METHAMPHETAMINE QUAL URINE POCT: NEGATIVE
OPIATE QUAL URINE POCT: NEGATIVE
OXYCODONE QUAL URINE POCT: NEGATIVE
PH QUAL URINE POCT: NORMAL
PHENCYCLIDINE QUAL URINE POCT: NEGATIVE
POCT KIT EXPIRATION DATE: NORMAL
POCT KIT LOT NUMBER: NORMAL
SPECIFIC GRAVITY POCT: 1.02
TEMPERATURE URINE POCT: NORMAL
THC QUAL URINE POCT: NEGATIVE

## 2023-11-22 PROCEDURE — 80305 DRUG TEST PRSMV DIR OPT OBS: CPT

## 2023-11-22 PROCEDURE — 99214 OFFICE O/P EST MOD 30 MIN: CPT

## 2023-11-22 PROCEDURE — H0038 SELF-HELP/PEER SVC PER 15MIN: HCPCS

## 2023-11-22 PROCEDURE — 99207 PR NO CHARGE BEHAVIORAL WARM HANDOFF: CPT | Performed by: SOCIAL WORKER

## 2023-11-22 RX ORDER — BUPRENORPHINE AND NALOXONE 8; 2 MG/1; MG/1
FILM, SOLUBLE BUCCAL; SUBLINGUAL
Qty: 92 FILM | Refills: 0 | Status: SHIPPED | OUTPATIENT
Start: 2023-11-24 | End: 2024-03-08

## 2023-11-22 RX ORDER — VENLAFAXINE HYDROCHLORIDE 150 MG/1
150 CAPSULE, EXTENDED RELEASE ORAL DAILY
Qty: 30 CAPSULE | Refills: 1 | Status: SHIPPED | OUTPATIENT
Start: 2023-11-22 | End: 2024-03-08

## 2023-11-22 RX ORDER — OLANZAPINE 5 MG/1
TABLET ORAL
Qty: 45 TABLET | Refills: 0 | Status: SHIPPED | OUTPATIENT
Start: 2023-11-22 | End: 2024-03-08

## 2023-11-22 RX ORDER — BUPRENORPHINE AND NALOXONE 8; 2 MG/1; MG/1
3 FILM, SOLUBLE BUCCAL; SUBLINGUAL DAILY
Qty: 92 FILM | Refills: 0 | Status: SHIPPED | OUTPATIENT
Start: 2023-11-22 | End: 2023-11-22

## 2023-11-22 ASSESSMENT — COLUMBIA-SUICIDE SEVERITY RATING SCALE - C-SSRS
2. HAVE YOU ACTUALLY HAD ANY THOUGHTS OF KILLING YOURSELF?: NO
ATTEMPT SINCE LAST CONTACT: NO
TOTAL  NUMBER OF INTERRUPTED ATTEMPTS SINCE LAST CONTACT: NO
LETHALITY/MEDICAL DAMAGE CODE MOST LETHAL POTENTIAL ATTEMPT: BEHAVIOR NOT LIKELY TO RESULT IN INJURY
TOTAL  NUMBER OF ABORTED OR SELF INTERRUPTED ATTEMPTS SINCE LAST CONTACT: NO
1. SINCE LAST CONTACT, HAVE YOU WISHED YOU WERE DEAD OR WISHED YOU COULD GO TO SLEEP AND NOT WAKE UP?: NO
LETHALITY/MEDICAL DAMAGE CODE MOST LETHAL ACTUAL ATTEMPT: NO PHYSICAL DAMAGE OR VERY MINOR PHYSICAL DAMAGE
SUICIDE, SINCE LAST CONTACT: NO
6. HAVE YOU EVER DONE ANYTHING, STARTED TO DO ANYTHING, OR PREPARED TO DO ANYTHING TO END YOUR LIFE?: NO

## 2023-11-22 ASSESSMENT — PATIENT HEALTH QUESTIONNAIRE - PHQ9: SUM OF ALL RESPONSES TO PHQ QUESTIONS 1-9: 9

## 2023-11-22 NOTE — PROGRESS NOTES
[facility] Follow Up    ASSESSMENT/PLAN                                                      1. Opioid use disorder, severe, dependence (H)  2. Encounter for monitoring opioid maintenance therapy  -needs improvement, slip after unable to fill suboxone   - buprenorphine HCl-naloxone HCl (SUBOXONE) 8-2 MG per film; Day 1 and day two take 8 mg repeat every 30 minutes to a max dose of 32 mg total daily dose After 2 day induction period take 8 mg TID  Dispense: 92 Film; Refill: 0  - Drugs of Abuse Screen Urine (POC CUPS) POCT  -Continue with AA/NA meetings  -confirmed access to narcan  -Counseling provided regarding   Opioid warning reviewed.    Risk of overdose following a period of abstinence due to decrease tolerance was discussed including risk of death.     Strongly recommended abstain from alcohol, benzodiazepines, THC, opioids and other drugs of abuse.     Increased risk of return to opioid use after use of these substances discussed.      Increased risk of overdose/death with use of other substances particularly benzodiazepines/alcohol reviewed.      3. Recurrent major depressive disorder, in partial remission (H24)  -no change in current mental health medications  - OLANZapine (ZYPREXA) 5 MG tablet; Take 0.5 tablets (2.5 mg) by mouth every morning AND 1 tablet (5 mg) at bedtime.  Dispense: 45 tablet; Refill: 0  - venlafaxine (EFFEXOR XR) 150 MG 24 hr capsule; Take 1 capsule (150 mg) by mouth daily  Dispense: 30 capsule; Refill: 1           Return in about 1 month (around 12/22/2023).  Patient counseling completed today:    Discussed mechanism of action, potential risks/benefits/side effects of medications and other recommendations above.     Discussed risk of precipitated withdrawal with initiation of buprenorphine in the presence of full opioid agonists.    Reviewed directions for initiation of buprenorphine to reduce risk of precipitated withdrawal and maximize efficacy.    Harm  reduction counseling including never use alone, availability of naloxone, risks associated with concurrent use of opioids and benzodiazepines, alcohol, or other sedatives, safer administration as applicable.  Discussed importance of avoiding isolation, building a network of supportive relationships, avoiding people/places/things associated with past use to reduce risk of relapse; including motivational interviewing regarding psychosocial interventions.   SUBJECTIVE                                                          Brief History:  Rm Merchant was first seen in Recovery Clinic on 08/10/23. They were referred by staff at [facility] due to difficulties filling his most recent rx for buprenorphine [facility].        Substance Use History :  Opioids:   Age at first use: 26 years heroin, use increased to daily while incarcerated x21 years.  Released Spring 2023.   Current use: substance: fentanyl; quantity alot; route: insufflation ; timing of last use: - 10/3/23                IV drug use: No   History of overdose: No  Previous residential or outpatient treatments for addiction : Yes: [facility] 6/7/23 completed residential then started OP  Previous medication treatments for addiction: Yes: suboxone  Longest period of sobriety: 2012 to present  Medical complications related to substance use: No  Hepatitis C: pt report neg; Date of most recent testing: no record  HIV: pt report neg; Date of most recent testing: no record     Other Addiction History:  Stimulants (cocaine, methamphetamine, MDMA/ecstasy)   Cocaine 13 years ago  Sedatives/hypnotics/anxiolytics: (benzodiazepines, GHB, Ambien, phenobarbital)  no  Alcohol:   past  Nicotine: (cigarettes, vaping, chew/snuff)  Yes, cigarettes  Cannabis:   past  Hallucinogens/Dissociatives: (acid, mushrooms, ketamine)  no  Eating disorder:  no  Gambling:              Yes, past        PAST PSYCHIATRIC HISTORY:  Diagnoses- PTSD, depression,anxiety  Suicide  "Attempts: No   Hospitalizations: No           9/5/2023     2:00 PM 9/18/2023     1:00 PM 10/23/2023    12:00 PM   PHQ   PHQ-9 Total Score 11 16 19   Q9: Thoughts of better off dead/self-harm past 2 weeks Several days Several days Not at all         Social History  Housing status: in a sober house- [employer] - through [facility]  Employment status: Unemployed, seeking work  Relationship status: Single  Children: no children  Legal: parole 2025  Insurance needs: Medicaid  Contact information up to date? yes      Most recent Recovery Clinic visit 10/23/2023.    A/P last visit:  1. Opioid use disorder, severe, dependence (H)  Reviewed recent one time return to use, reflected on risks and he is thankful to have not experience harm.  Encouraged him to continue Suboxone 8-2mg TID. Encouraged him to continue programming at [facility].     - buprenorphine HCl-naloxone HCl (SUBOXONE) 8-2 MG per film; Place 3 Film under the tongue daily  Dispense: 30 Film; Refill: 0     2. Accident caused by hypodermic needle, subsequent encounter  Reviewed his recent needle stick.  Discussed recommendation to recheck HIV status.  - HIV Antigen Antibody Combo; Future       11/22/23 visit:  Looking for job, having trouble finding a job.  Wants to work as peer .   Was not able to get to  yesterday so smoked fentanyl yesterday \"didn't like it\"   Requesting help with copay. Otherwise reporting 8 mg TID  Requesting refill on antidepressants        Labs discussed with patient?  Yes      [place] Prescription Drug Monitoring Program Reviewed:  Yes;   10/23/2023 10/23/2023 5 Suboxone 8 Mg-2 Mg Sl Film 30.00 10 Ka Par [id number] Marc ([id number]) 0/0 24.00 mg Medicaid [place]   09/18/2023 09/18/2023 5 Suboxone 8 Mg-2 Mg Sl Film 90.00 30 Ka Par [id number] Marc ([id number]) 0/0 24.00 mg Medicaid [place]       Medications:  buprenorphine HCl-naloxone HCl (SUBOXONE) 8-2 MG per film, Place 3 Film under the tongue daily  cyproheptadine (PERIACTIN) 4 MG tablet, Take 4 mg by " mouth 3 times daily as needed for allergies  dolutegravir (TIVICAY) 50 MG tablet, Take 1 tablet (50 mg) by mouth daily  emtricitabine-tenofovir (TRUVADA) 200-300 MG per tablet, Take 1 tablet by mouth daily  OLANZapine (ZYPREXA) 5 MG tablet, Take 0.5 tablets (2.5 mg) by mouth every morning AND 1 tablet (5 mg) At Bedtime.  venlafaxine (EFFEXOR XR) 150 MG 24 hr capsule, Take 1 capsule (150 mg) by mouth daily  Vitamin D3 (CHOLECALCIFEROL) 25 mcg (1000 units) tablet, Take 1 tablet by mouth daily    No current facility-administered medications on file prior to visit.      No Known Allergies    PMH, PSH, FamHx reviewed      OBJECTIVE                                                      BP (!) 147/87   Pulse 90     Physical Exam  HENT:      Head: Normocephalic.      Nose: Nose normal.   Eyes:      Extraocular Movements: Extraocular movements intact.      Pupils: Pupils are equal, round, and reactive to light.   Pulmonary:      Effort: Pulmonary effort is normal.   Musculoskeletal:         General: Normal range of motion.      Cervical back: Normal range of motion.   Neurological:      Mental Status: He is alert.   Psychiatric:         Mood and Affect: Mood normal.         Behavior: Behavior normal.         Thought Content: Thought content normal.         Judgment: Judgment normal.         Labs:    UDS:    Lab Results   Component Value Date    BUP Negative 11/22/2023    BZO Negative 11/22/2023    BAR Negative 11/22/2023    CLIFFORD Negative 11/22/2023    MAMP Negative 11/22/2023    AMP Negative 11/22/2023    MDMA Negative 11/22/2023    MTD Negative 11/22/2023    [id number] Negative 11/22/2023    OXY Negative 11/22/2023    PCP Negative 11/22/2023    THC Negative 11/22/2023    TEMP 90 F 11/22/2023    SGPOCT 1.025 11/22/2023     *POC urine drug screen does not screen for Fentanyl      Recent Results (from the past 240 hour(s))   Drugs of Abuse Screen Urine (POC CUPS) POCT    Collection Time: 11/22/23  1:41 PM   Result Value Ref Range     POCT Kit Lot Number [id number]     POCT Kit Expiration Date 8118415     Temperature Urine POCT 90 F 90 F, 92 F, 94 F, 96 F, 98 F, 100 F    Specific [place] POCT 1.025 1.005, 1.015, 1.025    pH Qual Urine POCT 5 pH 4 pH, 5 pH, 7 pH, 9 pH    Creatinine Qual Urine POCT 50 mg/dL 20 mg/dL, 50 mg/dL, 100 mg/dL, 200 mg/dL    Internal QC Qual Urine POCT Valid Valid    Amphetamine Qual Urine POCT Negative Negative    Barbiturate Qual Urine POCT Negative Negative    Buprenorphine Qual Urine POCT Negative Negative    Benzodiazepine Qual Urine POCT Negative Negative    Cocaine Qual Urine POCT Negative Negative    Methamphetamine Qual Urine POCT Negative Negative    MDMA Qual Urine POCT Negative Negative    Methadone Qual Urine POCT Negative Negative    Opiate Qual Urine POCT Negative Negative    Oxycodone Qual Urine POCT Negative Negative    Phencyclidine Qual Urine POCT Negative Negative    THC Qual Urine POCT Negative Negative             Wandy Rivera NP    [place] [id number] [address] [id number] [phone]

## 2023-11-22 NOTE — NURSING NOTE
[facility]    Need med rills , no money for copay  Rooming information:  Approximate last use of full opioid agonist: fentanyl 11/21/23  Taking buprenorphine? Yes:   How much per day? 24mg  Number of buprenorphine films/tablets remaining currently: 0  Side effects related to buprenorphine (constipation, dry mouth, sedation?) No   Narcan currently available: Yes  Other recent substance use:    Denies  NICOTINE-Yes: cigarettes  If using nicotine, ready to quit? Yes:     Point of care urine drug screen positive for:  Lab Results   Component Value Date    BUP Negative 11/22/2023    BZO Negative 11/22/2023    BAR Negative 11/22/2023    CLIFFORD Negative 11/22/2023    MAMP Negative 11/22/2023    AMP Negative 11/22/2023    MDMA Negative 11/22/2023    MTD Negative 11/22/2023    [id number] Negative 11/22/2023    OXY Negative 11/22/2023    PCP Negative 11/22/2023    THC Negative 11/22/2023    TEMP 90 F 11/22/2023    SGPOCT 1.025 11/22/2023       *POC urine drug screen does not screen for Fentanyl          11/22/2023     1:00 PM   PHQ Assesment Total Score(s)   PHQ-9 Score 9       If PHQ-9 score of 15 or higher, has Recovery Clinic therapist or provider been notified? No    Any current suicidal ideation? Yes, states answered incorrectly no recent suicidal ideations  If yes, has Recovery Clinic therapist or provider been notified? Yes    Primary care provider: Physician No Ref-Primary     Mental health provider: Yes (follow up on MH referral if needed)    Insurance needs: need funds for copay    Housing needs: stable at sober house    Current legal issues: no    Contact information up to date? yes    [employer]  not today (please obtain RAMIRO if pt would like to include)    Orville Kent MA  November 22, 2023  1:36 PM

## 2023-11-22 NOTE — PROGRESS NOTES
[facility]    Peer  met with Edgar Lozano in the Recovery Clinic to introduce herself, detail services provided and discuss current status of recovery. Pt appeared alert, oriented and open to feedback during our discussion.     Pt arrives for visit with provider for suboxone.  [facility] sees patient today under provider diagnosis of opioid substance disorder, severe, dependence  (H)   Worked with Pt to get help for his copay and then gave Pt some resources outside of the clinic that would probably be willing to pay his copay until he lands on his feet.      PRC provided business card to pt welcoming contact for recovery based support and resources. PRC and pt agree to speak again during an upcoming  visit.           Service Type:     Individual     Session Start Time:        2:15               Session End Time:    2:30    Session Length:         15 minutes    Patient Goal:   To utilize suboxone assisted treatment for sobriety and long term recovery.     Goal Progress:   Ongoing.    Key Risk Factors to Recovery:   PRC encourages being aware of risk factors that can lead to re-use which include avoiding isolation, avoiding triggers and managing cravings in a healthy manner. being open and willing to acceptance and change on a daily basis.  PRC encourages pt to establish a sober network calling tree to reach out to when needed.  Continue to practice honesty with ourselves and trusted support person(s).   PRC encourages regular attendance at recovery based meetings as well as finding a sponsor for mentoring and accountability.   PRC encourages consideration of other services such as counseling for mental health issues which can correlate with our substance use.      Support Needs:   Ongoing care, support and resources for opioid substance use disorder.     Follow up:   [facility] has provided pt with her contact information for support and resource needs.    PRC and pt  agree to meet during an upcoming  visit.       [facility]  [address], [address]   [place], [place], [address]  Clinic Phone: [phone]  Clinic Fax: [phone]  Peer  phone: [phone]    Open Monday - Friday  9:00am-4:00pm  Walk in hours: 9am-3pm      Jolie Campos  November 22, 2023  4:35 PM    LUAN Matta provides clinical oversite and supervision of care.

## 2023-11-22 NOTE — PATIENT INSTRUCTIONS
Buprenorphine Tips:  Make sure you are letting your buprenorphine tablets or films dissolve completely under your tongue so you know you are getting all the medication.  Don't eat, drink, or talk while taking your buprenorphine.  Avoid nicotine for 15-30 minutes before taking buprenorphine - this can cause the blood vessels to constrict and you may not absorb the medication as well.  To avoid potential dental issues related to buprenorphine rinse your mouth with water after taking your dose.  Avoid brushing your teeth for 1 hour after taking a dose.     Constipation is the most common side effect of buprenorphine.  Here are some simple things you can try to ease constipation.  Eating more fiber (fruits, vegetable, and whole grains) and drinking enough fluid (urine should be light yellow).    Take Miralax (polyethylene gylcol).  Use 1 capful daily until you start to have loose stools and then decrease use.  You can also try Colace (docusate) 100mg twice daily as needed.  These medications can be prescribed or purchased OTC.  Let your provider know if you are still struggling with constipation.    What to do if you experience nausea or upset stomach after taking buprenorphine:  Make sure you are letting it dissolve completely.  After the medication has dissolved try spitting out your saliva instead of swallowing it.    Other common side effects include:   - sleepiness/drowsiness OR trouble sleeping  - headaches    Please discuss any side effects with your provider.    Important safety info:  Ensure your medication is stored in a safe place out of sight and out of reach from kids and pets, ideally locked away.   Do not combine buprenorphine with other sedating substances or medications such as alcohol, benzodiazepines (Xanax or alprazolam for example), or other opioids.  Only take medications as prescribed.    Buprenorphine Self Start:    1) Take a day off and have a place to rest.  2) Stop using, and wait until you  feel very sick from withdrawals  3) Dose one or two 8mg films or tablets under your tongue (first dose 8-16mg)  4) Take another one or two 8mg films or tablets an hour later to feel well (can take these immediately after first dose if your symptoms get worse)  5) The next day, take 2-4 films or tablets (16-32mg) at once.    6) The next day, continue taking 2-3 films or tablets every day to control withdrawal symptoms and cravings.

## 2023-11-22 NOTE — PROGRESS NOTES
The patient completed the [place] as he scored elevated on the PHQ-9 and he stated that he did not read the question on the PHQ-9-he scored no risk on the [place].

## 2024-02-22 ENCOUNTER — HOSPITAL ENCOUNTER (EMERGENCY)
Facility: CLINIC | Age: 49
Discharge: HOME | End: 2024-02-22
Attending: STUDENT IN AN ORGANIZED HEALTH CARE EDUCATION/TRAINING PROGRAM | Admitting: STUDENT IN AN ORGANIZED HEALTH CARE EDUCATION/TRAINING PROGRAM
Payer: COMMERCIAL

## 2024-02-22 VITALS
TEMPERATURE: 97.4 F | HEART RATE: 86 BPM | SYSTOLIC BLOOD PRESSURE: 156 MMHG | RESPIRATION RATE: 16 BRPM | OXYGEN SATURATION: 99 % | DIASTOLIC BLOOD PRESSURE: 106 MMHG

## 2024-02-22 DIAGNOSIS — R30.0 DYSURIA: ICD-10-CM

## 2024-02-22 DIAGNOSIS — R36.9 PENILE DISCHARGE: ICD-10-CM

## 2024-02-22 LAB
ALBUMIN UR-MCNC: 30 MG/DL
APPEARANCE UR: ABNORMAL
BILIRUB UR QL STRIP: NEGATIVE
COLOR UR AUTO: YELLOW
GLUCOSE UR STRIP-MCNC: NEGATIVE MG/DL
HGB UR QL STRIP: NEGATIVE
KETONES UR STRIP-MCNC: NEGATIVE MG/DL
LEUKOCYTE ESTERASE UR QL STRIP: ABNORMAL
NITRATE UR QL: NEGATIVE
PH UR STRIP: 8 [PH] (ref 5–7)
RBC URINE: 2 /HPF
SP GR UR STRIP: 1.02 (ref 1–1.03)
SQUAMOUS EPITHELIAL: <1 /HPF
T VAGINALIS DNA SPEC QL NAA+PROBE: NOT DETECTED
TRANSITIONAL EPI: <1 /HPF
UROBILINOGEN UR STRIP-MCNC: 12 MG/DL
WBC URINE: 126 /HPF

## 2024-02-22 PROCEDURE — 250N000009 HC RX 250: Performed by: STUDENT IN AN ORGANIZED HEALTH CARE EDUCATION/TRAINING PROGRAM

## 2024-02-22 PROCEDURE — 99284 EMERGENCY DEPT VISIT MOD MDM: CPT | Performed by: STUDENT IN AN ORGANIZED HEALTH CARE EDUCATION/TRAINING PROGRAM

## 2024-02-22 PROCEDURE — 96372 THER/PROPH/DIAG INJ SC/IM: CPT | Performed by: STUDENT IN AN ORGANIZED HEALTH CARE EDUCATION/TRAINING PROGRAM

## 2024-02-22 PROCEDURE — 87661 TRICHOMONAS VAGINALIS AMPLIF: CPT | Performed by: STUDENT IN AN ORGANIZED HEALTH CARE EDUCATION/TRAINING PROGRAM

## 2024-02-22 PROCEDURE — 87491 CHLMYD TRACH DNA AMP PROBE: CPT | Performed by: STUDENT IN AN ORGANIZED HEALTH CARE EDUCATION/TRAINING PROGRAM

## 2024-02-22 PROCEDURE — 250N000011 HC RX IP 250 OP 636: Performed by: STUDENT IN AN ORGANIZED HEALTH CARE EDUCATION/TRAINING PROGRAM

## 2024-02-22 PROCEDURE — 81001 URINALYSIS AUTO W/SCOPE: CPT | Performed by: STUDENT IN AN ORGANIZED HEALTH CARE EDUCATION/TRAINING PROGRAM

## 2024-02-22 PROCEDURE — 87086 URINE CULTURE/COLONY COUNT: CPT | Performed by: STUDENT IN AN ORGANIZED HEALTH CARE EDUCATION/TRAINING PROGRAM

## 2024-02-22 RX ORDER — DOXYCYCLINE HYCLATE 100 MG
100 TABLET ORAL 2 TIMES DAILY
Qty: 14 TABLET | Refills: 0 | Status: SHIPPED | OUTPATIENT
Start: 2024-02-22 | End: 2024-02-29

## 2024-02-22 RX ADMIN — LIDOCAINE HYDROCHLORIDE 500 MG: 10 INJECTION, SOLUTION EPIDURAL; INFILTRATION; INTRACAUDAL; PERINEURAL at 18:47

## 2024-02-22 ASSESSMENT — ACTIVITIES OF DAILY LIVING (ADL): ADLS_ACUITY_SCORE: 33

## 2024-02-22 ASSESSMENT — COLUMBIA-SUICIDE SEVERITY RATING SCALE - C-SSRS
2. HAVE YOU ACTUALLY HAD ANY THOUGHTS OF KILLING YOURSELF IN THE PAST MONTH?: NO
1. IN THE PAST MONTH, HAVE YOU WISHED YOU WERE DEAD OR WISHED YOU COULD GO TO SLEEP AND NOT WAKE UP?: NO
6. HAVE YOU EVER DONE ANYTHING, STARTED TO DO ANYTHING, OR PREPARED TO DO ANYTHING TO END YOUR LIFE?: NO

## 2024-02-22 NOTE — ED PROVIDER NOTES
ED Provider Note  [facility]      History     Chief Complaint   Patient presents with    Exposure to STD    UTI     HPI  Edgar Lozano is a 49 year old male PMH depression,  opioid use disorder who presents to the ER for exposure to STI.  Patient states that he has a sexual partner who told him she was diagnosed with gonorrhea.  He is not aware of her testing positive for any other diseases.  He is having dysuria and penile discharge.  He is not having other significant systemic symptoms.  He previously has received prophylactic treatment but denies a history of HIV.  He states he does not have good phone access, has not seen primary care recently.  We discussed the need for easy telephone follow-up if we were to initiate blood testing for further diseases in the ED which he stated he did not think he had.    States that he needs to return to a treatment facility soon and is not interested in remaining for lab test results.    Past Medical History  No past medical history on file.  No past surgical history on file.  doxycycline hyclate (VIBRA-TABS) 100 MG tablet  buprenorphine HCl-naloxone HCl (SUBOXONE) 8-2 MG per film  cyproheptadine (PERIACTIN) 4 MG tablet  dolutegravir (TIVICAY) 50 MG tablet  emtricitabine-tenofovir (TRUVADA) 200-300 MG per tablet  OLANZapine (ZYPREXA) 5 MG tablet  venlafaxine (EFFEXOR XR) 150 MG 24 hr capsule  Vitamin D3 (CHOLECALCIFEROL) 25 mcg (1000 units) tablet      No Known Allergies  Family History  Family History   Problem Relation Age of Onset    Substance Abuse Mother     Substance Abuse Father      Social History   Social History     Tobacco Use    Smoking status: Every Day     Types: Cigarettes    Smokeless tobacco: Never   Vaping Use    Vaping Use: Never used   Substance Use Topics    Alcohol use: Yes    Drug use: Yes     Types: Fentanyl     Comment: 10/15/2023         A medically appropriate review of systems was performed with  pertinent positives and negatives noted in the HPI, and all other systems negative.    Physical Exam   BP: (!) 156/106  Pulse: 86  Temp: 97.4  F (36.3  C)  Resp: 16  SpO2: 99 %  Physical Exam  Vital Signs Reviewed  Gen: Well nourished, well developed, resting comfortably, no acute distress  HEENT: NC/AT, PERRL, EOMI, MMM  Neck: Supple, FROM  CV: Regular Rate, no murmur/rub/gallop  Lungs/Chest: Normal Effort, CTAB  Abd: Non-distended, non-tender  MSK/Back: FROM, no visible deformity  : Deferred  Neuro: A&Ox3, GCS 15, CN II-XII unremarkable  Skin: Warm, Dry, Intact, no visible lesions    ED Course, Procedures, & Data      Patient seen and evaluated in vertical triage  Urinalysis, urine gonorrhea chlamydia, urine trichomonas ordered  Urinalysis consistent with infection, likely STI given history.  No trichomonas documented and urinalysis results  CR test will not result today  Patient is not a good candidate for ED blood work given his follow-up concerns  Patient directed to [place] primary [facility] or [facility] testing, given intramuscular ceftriaxone, 7-day supply of doxycycline picked up from the discharge pharmacy for the patient    Procedures                     Results for orders placed or performed during the hospital encounter of 02/22/24   UA with Microscopic reflex to Culture     Status: Abnormal    Specimen: Urine, Clean Catch   Result Value Ref Range    Color Urine Yellow Colorless, Straw, Light Yellow, Yellow    Appearance Urine Slightly Cloudy (A) Clear    Glucose Urine Negative Negative mg/dL    Bilirubin Urine Negative Negative    Ketones Urine Negative Negative mg/dL    Specific Gravity Urine 1.025 1.003 - 1.035    Blood Urine Negative Negative    pH Urine 8.0 (H) 5.0 - 7.0    Protein Albumin Urine 30 (A) Negative mg/dL    Urobilinogen Urine 12.0 (A) Normal, 2.0 mg/dL    Nitrite Urine Negative Negative    Leukocyte Esterase Urine Large (A) Negative    RBC Urine 2 <=2 /HPF    WBC  Urine 126 (H) <=5 /HPF    Squamous Epithelials Urine <1 <=1 /HPF    Transitional Epithelials Urine <1 <=1 /HPF    Narrative    Urine Culture ordered based on laboratory criteria     Medications   cefTRIAXone (ROCEPHIN) 500 mg in lidocaine injection (500 mg Intramuscular $Given 2/22/24 1220)     Labs Ordered and Resulted from Time of ED Arrival to Time of ED Departure   ROUTINE UA WITH MICROSCOPIC REFLEX TO CULTURE - Abnormal       Result Value    Color Urine Yellow      Appearance Urine Slightly Cloudy (*)     Glucose Urine Negative      Bilirubin Urine Negative      Ketones Urine Negative      Specific Gravity Urine 1.025      Blood Urine Negative      pH Urine 8.0 (*)     Protein Albumin Urine 30 (*)     Urobilinogen Urine 12.0 (*)     Nitrite Urine Negative      Leukocyte Esterase Urine Large (*)     RBC Urine 2      WBC Urine 126 (*)     Squamous Epithelials Urine <1      Transitional Epithelials Urine <1     CHLAMYDIA TRACHOMATIS/NEISSERIA GONORRHOEAE BY PCR   TRICHOMONAS VAGINALIS BY PCR   URINE CULTURE     No orders to display          Critical care was not performed.     Medical Decision Making  The patient's presentation was of moderate complexity (an undiagnosed new problem with uncertain diagnosis).    The patient's evaluation involved:  ordering and/or review of 3+ test(s) in this encounter (see separate area of note for details)    The patient's management necessitated moderate risk (prescription drug management including medications given in the ED).    Assessment & Plan    Edgar Lozano is a 49 year old male PMH depression,  opioid use disorder who presents to the ER for exposure to STI.  Patient has symptoms of gonorrhea.  He has exposure to gonorrhea.  He will be treated with ceftriaxone and doxycycline.  The individual that he was exposed to did not disclose any other infections.  We discussed blood testing in the ED.  He endorses concerns with reliability of phone contact and  follow-up so I do not think he is a good candidate for ED based testing for these diseases at this time.  Will refer patient to the primary care clinic at [facility] he previously has been seen through as well as the [facility] clinic.  Patient was instructed strictly to complete his course of antibiotics even if his symptoms improve.  Patient verbalizes his understanding that he will need to follow-up with primary care or [facility] clinic for additional STI testing.  He was encouraged to also maintain primary care follow-up for general health and screening for chronic diseases such as hypertension.  He is otherwise stable for discharge after receiving his medications.    I have reviewed the nursing notes. I have reviewed the findings, diagnosis, plan and need for follow up with the patient.    Discharge Medication List as of 2/22/2024  6:49 PM        START taking these medications    Details   doxycycline hyclate (VIBRA-TABS) 100 MG tablet Take 1 tablet (100 mg) by mouth 2 times daily for 7 days, Disp-14 tablet, R-0, E-Prescribe             Final diagnoses:   Penile discharge   Dysuria       Bart Salazar Jr., MD   [facility] EMERGENCY DEPARTMENT  2/22/2024     Bart Salazar MD  02/22/24 2037

## 2024-02-22 NOTE — ED TRIAGE NOTES
Pt arrives ambulatory to triage c/o STI exposure and painful and burning while urinating.      Triage Assessment (Adult)       Row Name 02/22/24 1918          Triage Assessment    Airway WDL WDL        Respiratory WDL    Respiratory WDL WDL        Cardiac WDL    Cardiac WDL WDL        Peripheral/Neurovascular WDL    Peripheral Neurovascular WDL WDL        Cognitive/Neuro/Behavioral WDL    Cognitive/Neuro/Behavioral WDL WDL

## 2024-02-23 ENCOUNTER — TELEPHONE (OUTPATIENT)
Dept: EMERGENCY MEDICINE | Facility: CLINIC | Age: 49
End: 2024-02-23
Payer: COMMERCIAL

## 2024-02-23 LAB
BACTERIA UR CULT: NO GROWTH
C TRACH DNA SPEC QL PROBE+SIG AMP: NEGATIVE
N GONORRHOEA DNA SPEC QL NAA+PROBE: POSITIVE

## 2024-02-23 NOTE — DISCHARGE INSTRUCTIONS
Thank you for coming to the [facility] ED.  We are empirically treating you with antibiotics for your exposure while urinary test results are pending.  You received a shot of antibiotics and will need to take these pills for 7 days, even if your symptoms completely improved.    We highly recommend that you follow-up with the primary care clinic at [place] or the [facility] clinic for additional testing.  You will need to call these clinics to set up an appointment.  The numbers are provided in your discharge paperwork above.  Please call one of these clinics within the next week to schedule a follow-up appointment for further testing.

## 2024-02-23 NOTE — TELEPHONE ENCOUNTER
[facility] ([place])    Reason for call: Lab Result Notification     Lab Result (including Rx patient on, if applicable).  If culture, copy of lab report at bottom.  Lab Result: Final N. Gonorrhoeae PCR is [POSITIVE] AND Chlamydia T PCR is [NEGATIVE]  (Specimen source: urine, voided)  Patient was treated for N. Gonorrhea AND/OR Chlamydia T in the [facility] Emergency Dept  [Yes or No]:  yes       If Yes, list what was given in the ED:  IM Ceftriaxone   [facility] Dept discharge antibiotic (if prescribed): Doxycycline 100 mg PO tablet, 1 tablet (100 mg) by mouth 2 times daily for 7 days   Recommendations in treatment per [facility] ED lab result Chlamydia T. AND/OR N. Gonorrhea protocol     Creatinine Level (mg/dl)   Creatinine   Date Value Ref Range Status   09/25/2023 0.74 0.67 - 1.17 mg/dL Final    Creatinine clearance (ml/min), if applicable    Serum creatinine: 0.74 mg/dL 09/25/23 1657  Estimated creatinine clearance: 150.5 mL/min     Phone number cannot accept calls at this time.  Unable to leave voicemail message requesting a call back to [facility] ED Lab Result RN at [phone].   Letter sent.       RN Recommendations/Instructions per [place] ED lab result protocol:   [facility] ED lab result protocol utilized: Gonorrhea / Chlamydia            Hannah Whitfield, RN

## 2024-02-23 NOTE — LETTER
February 23, 2024        Edgar Lozano  [address]  [place] [address]          Dear Edgar Lozano:    You were seen in the [facility] Emergency Department at [facility] EMERGENCY DEPARTMENT on 2/22/2024.  We are unable to reach you by phone, so we are sending you this letter.     It is important that you call [facility] Emergency Department lab result nurse at [phone], as we have information to relay to you AND/OR we MAY have to make some changes in your treatment.    Best time to call back is between 9AM and 5:30PM, 7 days a week.      Sincerely,     [facility] Emergency Department Lab Result RN  [phone]

## 2024-03-08 ENCOUNTER — OFFICE VISIT (OUTPATIENT)
Dept: BEHAVIORAL HEALTH | Facility: CLINIC | Age: 49
End: 2024-03-08
Payer: COMMERCIAL

## 2024-03-08 ENCOUNTER — APPOINTMENT (OUTPATIENT)
Dept: LAB | Facility: CLINIC | Age: 49
End: 2024-03-08
Payer: COMMERCIAL

## 2024-03-08 VITALS — DIASTOLIC BLOOD PRESSURE: 89 MMHG | SYSTOLIC BLOOD PRESSURE: 130 MMHG | HEART RATE: 72 BPM

## 2024-03-08 DIAGNOSIS — F17.200 TOBACCO USE DISORDER: ICD-10-CM

## 2024-03-08 DIAGNOSIS — Z51.81 ENCOUNTER FOR MONITORING OPIOID MAINTENANCE THERAPY: ICD-10-CM

## 2024-03-08 DIAGNOSIS — F11.20 OPIOID USE DISORDER, SEVERE, DEPENDENCE (H): Primary | ICD-10-CM

## 2024-03-08 DIAGNOSIS — Z72.51 HIGH RISK HETEROSEXUAL BEHAVIOR: ICD-10-CM

## 2024-03-08 DIAGNOSIS — Z86.19 HISTORY OF HEPATITIS C: ICD-10-CM

## 2024-03-08 DIAGNOSIS — Z79.891 ENCOUNTER FOR MONITORING OPIOID MAINTENANCE THERAPY: ICD-10-CM

## 2024-03-08 LAB
AMPHETAMINE QUAL URINE POCT: NEGATIVE
BARBITURATE QUAL URINE POCT: NEGATIVE
BENZODIAZEPINE QUAL URINE POCT: NEGATIVE
BUPRENORPHINE QUAL URINE POCT: NEGATIVE
COCAINE QUAL URINE POCT: NEGATIVE
CREATININE QUAL URINE POCT: NORMAL
INTERNAL QC QUAL URINE POCT: NORMAL
MDMA QUAL URINE POCT: NEGATIVE
METHADONE QUAL URINE POCT: NEGATIVE
METHAMPHETAMINE QUAL URINE POCT: NEGATIVE
OPIATE QUAL URINE POCT: NEGATIVE
OXYCODONE QUAL URINE POCT: NEGATIVE
PH QUAL URINE POCT: NORMAL
PHENCYCLIDINE QUAL URINE POCT: NEGATIVE
POCT KIT EXPIRATION DATE: NORMAL
POCT KIT LOT NUMBER: NORMAL
SPECIFIC GRAVITY POCT: 1.01
TEMPERATURE URINE POCT: NORMAL
THC QUAL URINE POCT: NEGATIVE

## 2024-03-08 PROCEDURE — 80305 DRUG TEST PRSMV DIR OPT OBS: CPT | Performed by: FAMILY MEDICINE

## 2024-03-08 PROCEDURE — 99215 OFFICE O/P EST HI 40 MIN: CPT | Performed by: FAMILY MEDICINE

## 2024-03-08 PROCEDURE — 87389 HIV-1 AG W/HIV-1&-2 AB AG IA: CPT | Performed by: FAMILY MEDICINE

## 2024-03-08 PROCEDURE — 86706 HEP B SURFACE ANTIBODY: CPT | Performed by: FAMILY MEDICINE

## 2024-03-08 PROCEDURE — 87491 CHLMYD TRACH DNA AMP PROBE: CPT | Performed by: FAMILY MEDICINE

## 2024-03-08 PROCEDURE — 86704 HEP B CORE ANTIBODY TOTAL: CPT | Performed by: FAMILY MEDICINE

## 2024-03-08 PROCEDURE — 86780 TREPONEMA PALLIDUM: CPT | Performed by: FAMILY MEDICINE

## 2024-03-08 PROCEDURE — 87340 HEPATITIS B SURFACE AG IA: CPT | Performed by: FAMILY MEDICINE

## 2024-03-08 PROCEDURE — 87591 N.GONORRHOEAE DNA AMP PROB: CPT | Performed by: FAMILY MEDICINE

## 2024-03-08 RX ORDER — POLYETHYLENE GLYCOL 3350 17 G/17G
1 POWDER, FOR SOLUTION ORAL DAILY PRN
Qty: 578 G | Refills: 11 | Status: SHIPPED | OUTPATIENT
Start: 2024-03-08

## 2024-03-08 RX ORDER — PRAZOSIN HYDROCHLORIDE 1 MG/1
1 CAPSULE ORAL AT BEDTIME
COMMUNITY
End: 2024-03-20

## 2024-03-08 RX ORDER — RISPERIDONE 1 MG/1
1 TABLET, ORALLY DISINTEGRATING ORAL 2 TIMES DAILY
COMMUNITY
End: 2024-03-20

## 2024-03-08 RX ORDER — BUPRENORPHINE AND NALOXONE 8; 2 MG/1; MG/1
3 FILM, SOLUBLE BUCCAL; SUBLINGUAL DAILY
Qty: 45 FILM | Refills: 0 | Status: SHIPPED | OUTPATIENT
Start: 2024-03-08 | End: 2024-03-20

## 2024-03-08 ASSESSMENT — PATIENT HEALTH QUESTIONNAIRE - PHQ9: SUM OF ALL RESPONSES TO PHQ QUESTIONS 1-9: 8

## 2024-03-08 NOTE — NURSING NOTE
[facility]   Pt returned to , last visit 11/22/23, pt request med refill, hasn't had suboxone approx 2mo ago, had relapsed approx 3 weeks ago and cravings has increased.  Rooming information:  Approximate last use of full opioid agonist: 2/19/2023  Taking buprenorphine? No  How much per day?   Number of buprenorphine films/tablets remaining currently: 0    Narcan currently available: Yes  Other recent substance use:    Alcohol   NICOTINE-Yes: cigarettes  If using nicotine, ready to quit? Yes:     Point of care urine drug screen positive for:  Lab Results   Component Value Date    BUP Negative 03/08/2024    BZO Negative 03/08/2024    BAR Negative 03/08/2024    CLIFFORD Negative 03/08/2024    MAMP Negative 03/08/2024    AMP Negative 03/08/2024    MDMA Negative 03/08/2024    MTD Negative 03/08/2024    [id number] Negative 03/08/2024    OXY Negative 03/08/2024    PCP Negative 03/08/2024    THC Negative 03/08/2024    TEMP 90 F 03/08/2024    SGPOCT 1.015 03/08/2024       *POC urine drug screen does not screen for Fentanyl            3/8/2024    10:00 AM   PHQ Assesment Total Score(s)   PHQ-9 Score 8       If PHQ-9 score of 15 or higher, has [facility] therapist or provider been notified? No    Any current suicidal ideation? No  If yes, has [facility] therapist or provider been notified? No    Primary care provider: [facility]     Mental health provider: yes [facility] [place](follow up on MH referral if needed)    Insurance needs: ACTIVE but states need help with co pay, has no money    Housing needs: currently at [facility] because he has no where to live - LOOKING FOR HOUSING     Current legal issues: NO    Contact information up to date? yes    3rd Party Involvement not today (please obtain RAMIRO if pt would like to include)    Orville Kent MA  March 8, 2024  10:08 AM

## 2024-03-08 NOTE — PROGRESS NOTES
[facility] Follow Up    ASSESSMENT/PLAN                                                    1. Opioid use disorder, severe, dependence (H)  Pt reporting last use of full opioid agonist ~2/19/24, had been using for about 3 weeks.  Now back in treatment at [facility] and would like to restart buprenorphine to address cravings.   Resume previously effective dose of buprenorphine 24mg/day.   Naloxone prescribed   Miralax prn OIC  Continue programming at [facility]  Baseline CMP and ID screening as noted  Reviewed option of XR buprenorphine and pt is not interested.   - buprenorphine HCl-naloxone HCl (SUBOXONE) 8-2 MG per film; Place 3 Film under the tongue daily  Dispense: 45 Film; Refill: 0  - naloxone (NARCAN) 4 MG/0.1ML nasal spray; Spray 1 spray (4 mg) into one nostril alternating nostrils as needed for opioid reversal every 2-3 minutes until assistance arrives  Dispense: 0.2 mL; Refill: 11  - polyethylene glycol (MIRALAX) 17 GM/Dose powder; Take 17 g (1 Capful) by mouth daily as needed for constipation  Dispense: 578 g; Refill: 11    2. Encounter for monitoring opioid maintenance therapy  - Drugs of Abuse Screen Urine (POC CUPS) POCT    3. Tobacco use disorder  Encouraged efforts to quit.  Reviewed options for NRT, varenicline.  He is interested in nicotine lozenges.   - nicotine (NICORETTE) 4 MG lozenge; Place 1 lozenge (4 mg) inside cheek every hour as needed for nicotine withdrawal symptoms  Dispense: 200 lozenge; Refill: 3    4. High risk heterosexual behavior  Pt is interested in testing  - Treponema Abs w Reflex to RPR and Titer; Future  - HIV Antigen Antibody Combo Cascade; Future  - Hepatitis B surface antigen; Future  - Hepatitis B Surface Antibody; Future  - Hepatitis B core antibody; Future  - NEISSERIA GONORRHOEA PCR  - CHLAMYDIA TRACHOMATIS PCR    5. History of hepatitis C  Rechecking HCV RNA and CMP due to possibility of re-exposure.    - Hepatitis C RNA, Quantitative by PCR with  Confirmatory Reflex to Genotyping; Future  - Comprehensive metabolic panel (BMP + Alb, Alk Phos, ALT, AST, Total. Bili, TP); Future      Return in 12 days (on 3/20/2024).    Patient counseling completed today:  Discussed mechanism of action, potential risks/benefits/side effects of medications and other recommendations above.     Harm reduction counseling including never use alone, availability of naloxone, risks associated with concurrent use of opioids and benzodiazepines, alcohol, or other sedatives, safer administration as applicable.  Discussed importance of avoiding isolation, building a network of supportive relationships, avoiding people/places/things associated with past use to reduce risk of relapse; including motivational interviewing regarding psychosocial interventions.     SUBJECTIVE                                                          CC/HPI:  Rm Merchant is a 49 year old male with PMH hepatitis C with evidence of spontaneous resolution and possible reexposure, MDD, polysubstance use disorder including opioids who presents to the [facility] for return visit.       Brief History:  Rm Merchant was first seen in Recovery Clinic on 08/10/23. They were referred by staff at [facility] due to difficulties filling his most recent rx for buprenorphine through [facility].     Lost to follow up after 11/23/23.  RTC 3/8/24    3/8/24 HPI:  Pt reports he had discontinued buprenorphine after his 11/2023 visit and did return to use of fentanyl approximately 3 weeks ago.  Pt states he experienced an overdose with this use.  He has since returned to treatment at [facility] ~3/1/24 and would like to resume buprenorphine.  He endorses high level of cravings for fentanyl.    He also reengaged in mental health care with a therapist and psych provider.  He is now taking risperidone 1mg bid and prazosin 1mg at bedtime.    He is working on getting his [employer] card, housing; he recently got  new glasses.  He is trying to go one step at a time, acknowledging the risk of relapse for him when he feels overwhelmed.   We reviewed his testing from 9/2023 that showed +hep C ab and undetectable HCV RNA.  He was not aware of hep C exposure.  He is interested in repeat HIV and other STI testing.  He denies IVDU and states he has sex with women.  He was treated for gonorrhea and chlamydia 2/22/24.  States the discharge he was having at that time did resolve.   He is smoking 3-4 cigarettes/day and occasionally vaping.  He would like to quit.  Has never used NRT.      Substance Use History :  Opioids:   Age at first use: 26 years heroin, use increased to daily while incarcerated x21 years.  Released Spring 2023.   Current use: substance: fentanyl; quantity alot; route: insufflation ; timing of last use: 2/19/2023                 IV drug use: No   History of overdose: No  Previous residential or outpatient treatments for addiction : Yes: [address] 6/7/23 completed residential then started OP  Previous medication treatments for addiction: Yes: suboxone  Longest period of sobriety: 2012 to present  Medical complications related to substance use: No  Hepatitis C: 9/18/23 HCV ab reactive, HCV RNA not detected  HIV: 9/25/23 HIV ag/ab nonreactive     Other Addiction History:  Stimulants   Cocaine 2010 last use  Sedatives/hypnotics/anxiolytics:   no  Alcohol:   past  Nicotine:   Yes, cigarettes 1/4 ppd and some vaping  Cannabis:   past  Hallucinogens/Dissociatives:   no  Eating disorder:  no  Gambling:              Yes, past        PAST PSYCHIATRIC HISTORY:  Diagnoses- PTSD, depression,anxiety  Suicide Attempts: No   Hospitalizations: No         10/23/2023    12:00 PM 11/22/2023     1:00 PM 3/8/2024    10:00 AM   PHQ   PHQ-9 Total Score 19 9 8   Q9: Thoughts of better off dead/self-harm past 2 weeks Not at all Several days Not at all       Social History  Housing status: residential at [facility]  Relationship status:  Single  Children: no children  Legal: parole 2025  Insurance needs: Medicaid  Contact information up to date? yes    Labs discussed with patient?  Yes      [place] Prescription Drug Monitoring Program Reviewed:  Yes; as expected  10/23/2023 10/23/2023 5 Suboxone 8 Mg-2 Mg Sl Film 30.00 10 Ka Par [id number] Marc ([id number]) 0/0 24.00 mg Medicaid [place]   09/18/2023 09/18/2023 5 Suboxone 8 Mg-2 Mg Sl Film 90.00 30 Ka Par [id number] Marc ([id number]) 0/0 24.00 mg [place]   09/05/2023 09/05/2023 5 Suboxone 8 Mg-2 Mg Sl Film 45.00 15 Al Hub [id number] Marc ([id number]) 0/0 24.00 mg [place]   08/23/2023 08/22/2023 5 Suboxone 8 Mg-2 Mg Sl Film 45.00 15 Cl Max [id number] Marc ([id number]) 0/0 24.00 mg [place]   08/10/2023 08/10/2023 5 Suboxone 8 Mg-2 Mg Sl Film 45.00 15 Li Vol [id number] Marc          Medications:  prazosin (MINIPRESS) 1 MG capsule, Take 1 mg by mouth at bedtime  risperiDONE (RISPERDAL M-TABS) 1 MG ODT, Take 1 mg by mouth 2 times daily    No current facility-administered medications on file prior to visit.      No Known Allergies    PMH, PSH, FamHx reviewed      OBJECTIVE                                                      /89   Pulse 72     Physical Exam  Constitutional:       General: He is not in acute distress.     Appearance: Normal appearance.   HENT:      Head: Normocephalic and atraumatic.   Pulmonary:      Effort: Pulmonary effort is normal.   Neurological:      Mental Status: He is alert and oriented to person, place, and time.      Coordination: Coordination is intact.      Gait: Gait is intact.   Psychiatric:         Attention and Perception: Attention and perception normal.         Mood and Affect: Mood is anxious. Affect is not inappropriate.         Speech: Speech normal.         Behavior: Behavior normal. Behavior is cooperative.         Thought Content: Thought content normal.      Comments: Judgment/insight fair         Labs:    UDS:    Lab Results   Component Value Date    BUP Negative 03/08/2024    BZO Negative  03/08/2024    BAR Negative 03/08/2024    CLIFFORD Negative 03/08/2024    MAMP Negative 03/08/2024    AMP Negative 03/08/2024    MDMA Negative 03/08/2024    MTD Negative 03/08/2024    [id number] Negative 03/08/2024    OXY Negative 03/08/2024    PCP Negative 03/08/2024    THC Negative 03/08/2024    TEMP 90 F 03/08/2024    SGPOCT 1.015 03/08/2024     *POC urine drug screen does not screen for Fentanyl      Recent Results (from the past 240 hour(s))   Drugs of Abuse Screen Urine (POC CUPS) POCT    Collection Time: 03/08/24 10:15 AM   Result Value Ref Range    POCT Kit Lot Number [id number]     POCT Kit Expiration Date 14333204     Temperature Urine POCT 90 F 90 F, 92 F, 94 F, 96 F, 98 F, 100 F    Specific [place] POCT 1.015 1.005, 1.015, 1.025    pH Qual Urine POCT 7 pH 4 pH, 5 pH, 7 pH, 9 pH    Creatinine Qual Urine POCT 50 mg/dL 20 mg/dL, 50 mg/dL, 100 mg/dL, 200 mg/dL    Internal QC Qual Urine POCT Valid Valid    Amphetamine Qual Urine POCT Negative Negative    Barbiturate Qual Urine POCT Negative Negative    Buprenorphine Qual Urine POCT Negative Negative    Benzodiazepine Qual Urine POCT Negative Negative    Cocaine Qual Urine POCT Negative Negative    Methamphetamine Qual Urine POCT Negative Negative    MDMA Qual Urine POCT Negative Negative    Methadone Qual Urine POCT Negative Negative    Opiate Qual Urine POCT Negative Negative    Oxycodone Qual Urine POCT Negative Negative    Phencyclidine Qual Urine POCT Negative Negative    THC Qual Urine POCT Negative Negative   Comprehensive metabolic panel (BMP + Alb, Alk Phos, ALT, AST, Total. Bili, TP)    Collection Time: 03/08/24 11:29 AM   Result Value Ref Range    Sodium 135 135 - 145 mmol/L    Potassium 4.7 3.4 - 5.3 mmol/L    Carbon Dioxide (CO2) 30 (H) 22 - 29 mmol/L    Anion Gap 7 7 - 15 mmol/L    Urea Nitrogen 13.5 6.0 - 20.0 mg/dL    Creatinine 1.22 (H) 0.67 - 1.17 mg/dL    GFR Estimate 74 >60 mL/min/1.73m2    Calcium 9.2 8.6 - 10.0 mg/dL    Chloride 98 98 - 107 mmol/L     Glucose 90 70 - 99 mg/dL    Alkaline Phosphatase 83 40 - 150 U/L    AST 22 0 - 45 U/L    ALT 30 0 - 70 U/L    Protein Total 7.3 6.4 - 8.3 g/dL    Albumin 4.4 3.5 - 5.2 g/dL    Bilirubin Total 0.5 <=1.2 mg/dL     At least 40min spent on day of visit in review of medical record,  review, obtaining histories, discussing diagnoses and recommendations, counseling, coordination of care.     Kassie Mckinley MD  Addiction Medicine  [address] [id number] [phone]

## 2024-03-09 LAB
C TRACH DNA SPEC QL NAA+PROBE: NEGATIVE
N GONORRHOEA DNA SPEC QL NAA+PROBE: NEGATIVE

## 2024-03-20 ENCOUNTER — TELEPHONE (OUTPATIENT)
Dept: BEHAVIORAL HEALTH | Facility: CLINIC | Age: 49
End: 2024-03-20

## 2024-03-20 ENCOUNTER — OFFICE VISIT (OUTPATIENT)
Dept: BEHAVIORAL HEALTH | Facility: CLINIC | Age: 49
End: 2024-03-20
Payer: COMMERCIAL

## 2024-03-20 VITALS — SYSTOLIC BLOOD PRESSURE: 161 MMHG | HEART RATE: 75 BPM | DIASTOLIC BLOOD PRESSURE: 86 MMHG

## 2024-03-20 DIAGNOSIS — Z51.81 ENCOUNTER FOR MONITORING OPIOID MAINTENANCE THERAPY: ICD-10-CM

## 2024-03-20 DIAGNOSIS — F17.200 TOBACCO USE DISORDER: ICD-10-CM

## 2024-03-20 DIAGNOSIS — F33.41 RECURRENT MAJOR DEPRESSIVE DISORDER, IN PARTIAL REMISSION (H): ICD-10-CM

## 2024-03-20 DIAGNOSIS — Z79.891 ENCOUNTER FOR MONITORING OPIOID MAINTENANCE THERAPY: ICD-10-CM

## 2024-03-20 DIAGNOSIS — F11.20 OPIOID USE DISORDER, SEVERE, DEPENDENCE (H): Primary | ICD-10-CM

## 2024-03-20 LAB
AMPHETAMINE QUAL URINE POCT: NEGATIVE
BARBITURATE QUAL URINE POCT: NEGATIVE
BENZODIAZEPINE QUAL URINE POCT: NEGATIVE
BUPRENORPHINE QUAL URINE POCT: ABNORMAL
COCAINE QUAL URINE POCT: NEGATIVE
CREAT UR-MCNC: 69 MG/DL
CREATININE QUAL URINE POCT: ABNORMAL
INTERNAL QC QUAL URINE POCT: ABNORMAL
MDMA QUAL URINE POCT: NEGATIVE
METHADONE QUAL URINE POCT: NEGATIVE
METHAMPHETAMINE QUAL URINE POCT: NEGATIVE
OPIATE QUAL URINE POCT: NEGATIVE
OXYCODONE QUAL URINE POCT: NEGATIVE
PH QUAL URINE POCT: ABNORMAL
PHENCYCLIDINE QUAL URINE POCT: NEGATIVE
POCT KIT EXPIRATION DATE: ABNORMAL
POCT KIT LOT NUMBER: ABNORMAL
SPECIFIC GRAVITY POCT: 1.02
TEMPERATURE URINE POCT: ABNORMAL
THC QUAL URINE POCT: NEGATIVE

## 2024-03-20 PROCEDURE — 80305 DRUG TEST PRSMV DIR OPT OBS: CPT | Performed by: FAMILY MEDICINE

## 2024-03-20 PROCEDURE — 99214 OFFICE O/P EST MOD 30 MIN: CPT | Performed by: FAMILY MEDICINE

## 2024-03-20 PROCEDURE — G0480 DRUG TEST DEF 1-7 CLASSES: HCPCS | Performed by: FAMILY MEDICINE

## 2024-03-20 RX ORDER — PRAZOSIN HYDROCHLORIDE 1 MG/1
2 CAPSULE ORAL AT BEDTIME
COMMUNITY
Start: 2024-03-20

## 2024-03-20 RX ORDER — BUPRENORPHINE AND NALOXONE 8; 2 MG/1; MG/1
3 FILM, SOLUBLE BUCCAL; SUBLINGUAL DAILY
Qty: 90 FILM | Refills: 0 | Status: SHIPPED | OUTPATIENT
Start: 2024-03-20 | End: 2024-04-16

## 2024-03-20 RX ORDER — RISPERIDONE 1 MG/1
2 TABLET, ORALLY DISINTEGRATING ORAL DAILY
COMMUNITY
Start: 2024-03-20

## 2024-03-20 ASSESSMENT — PATIENT HEALTH QUESTIONNAIRE - PHQ9: SUM OF ALL RESPONSES TO PHQ QUESTIONS 1-9: 11

## 2024-03-20 NOTE — TELEPHONE ENCOUNTER
Incoming call from staff at [facility] regarding patients appointment in the Recovery Clinic today; questions regarding the following medications; Prazosin and Risperidone.     No RAMIRO on file, writer requested to have one sent to the Recovery Clinic. RAMIRO was received and not properly filled out. According to staff it is filled out on their end and that the Recovery Clinic is the only clinic that has a problem receiving their RAMIRO's. Writer requested they print out the RAMIRO and fax it.     Writer contacted the patient who reports his Prazosin and Risperidone prescriptions are provided by psych provider w/[facility]. Patient will update staff at [facility] with this information.     Krista Erickson RN on 3/20/2024 at 4:54 PM

## 2024-03-20 NOTE — PROGRESS NOTES
[facility] Follow Up    ASSESSMENT/PLAN                                                    1. Opioid use disorder, severe, dependence (H)  Tolerated resumption of buprenorphine 2 weeks ago without difficulty.   Continue buprenorphine 24mg/day  Continue programming at [facility]  Continue Miralax prn OIC  Pt confirms he has naloxone  - Drug Confirmation Panel Urine with Creat - lab collect; Future  - buprenorphine HCl-naloxone HCl (SUBOXONE) 8-2 MG per film; Place 3 Film under the tongue daily  Dispense: 90 Film; Refill: 0  - Drug Confirmation Panel Urine with Creat - lab collect    2. Encounter for monitoring opioid maintenance therapy  - Drugs of Abuse Screen Urine (POC CUPS) POCT  - Drug Confirmation Panel Urine with Creat - lab collect; Future  - Drug Confirmation Panel Urine with Creat - lab collect    3. Tobacco use disorder  Encouraged efforts to quit.  Continue NRT    4. Recurrent major depressive disorder, in partial remission (H24)  Prescribed sertraline, risperidone, and prazosin [facility].  Continue to follow-up as recommended.         Return in about 4 weeks (around 4/17/2024).    Patient counseling completed today:  Discussed mechanism of action, potential risks/benefits/side effects of medications and other recommendations above.     Harm reduction counseling including never use alone, availability of naloxone, risks associated with concurrent use of opioids and benzodiazepines, alcohol, or other sedatives, safer administration as applicable.  Discussed importance of avoiding isolation, building a network of supportive relationships, avoiding people/places/things associated with past use to reduce risk of relapse; including motivational interviewing regarding psychosocial interventions.     SUBJECTIVE                                                          CC/HPI:  Rm Merchant is a 49 year old male with PMH hepatitis C with evidence of  spontaneous resolution, MDD, polysubstance use disorder including opioids on buprenorphine who presents to the Recovery Clinic for return visit.       Brief History:  Rm Merchant was first seen in Recovery Clinic on 08/10/23. They were referred by staff at [facility] due to difficulties filling his most recent rx for buprenorphine through [facility].     Lost to follow up after 11/23/23.  RTC 3/8/24 following ~1 month of return to use of fentanyl.  Resumed buprenorphine 24mg/day.  In programming at [facility].     3/20/24 HPI:  Pt returns for follow-up 2 weeks from last visit as recommended.  He was able to resume buprenorphine 24mg/day without difficulty.  He has been experiencing side effect of constipation, but using Miralax which is helping.  He denies cravings for opioids or other substances.    He is using nicotine lozenges intermittently and finds these effective in addressing nicotine cravings.  He has decreased to 2-3 cigarettes per day.    He has continued to work with his psych provider.  Sertraline was prescribed recently which pt will start soon.  Prazosin was increased to 2mg at night and risperidone changed to 2mg every morning.    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  Has been doing some outreach work as well.    Has appt to establish care with PCP on 4/10/24.       Substance Use History :  Opioids:   Age at first use: 26 years heroin, use increased to daily while incarcerated x21 years.  Released Spring 2023.   Current use: substance: fentanyl; quantity alot; route: insufflation ; timing of last use: 2/19/2023                 IV drug use: No   History of overdose: No  Previous residential or outpatient treatments for addiction : Yes: [facility] [address] 6/7/23 completed residential then started OP  Previous medication treatments for addiction: Yes: suboxone  Longest period of sobriety: 2012 to present  Medical complications related to substance use: No  Hepatitis C: 3/8/24 HCV RNA  not detected  HIV: 3/8/24 HIV ag/ab nonreactive     Other Addiction History:  Stimulants   Cocaine 2010 last use  Sedatives/hypnotics/anxiolytics:   no  Alcohol:   past  Nicotine:   Yes, cigarettes 1/4 ppd and some vaping  Cannabis:   past  Hallucinogens/Dissociatives:   no  Eating disorder:  no  Gambling:              Yes, past        PAST PSYCHIATRIC HISTORY:  Diagnoses- PTSD, depression,anxiety  Suicide Attempts: No   Hospitalizations: No         11/22/2023     1:00 PM 3/8/2024    10:00 AM 3/20/2024    11:00 AM   PHQ   PHQ-9 Total Score 9 8 11   Q9: Thoughts of better off dead/self-harm past 2 weeks Several days Not at all Not at all       Social History  Housing status: residential at [facility]  Relationship status: Single  Children: no children  Legal: parole 2025  Insurance needs: Medicaid  Contact information up to date? yes    Labs discussed with patient?  Yes      [place] Prescription Drug Monitoring Program Reviewed:  Yes; as expected  03/08/2024 03/08/2024 5 Suboxone 8 Mg-2 Mg Sl Film 45.00 15 Li Vol [id number] Marc ([id number]) 0/0 24.00 mg Medicaid [place]   10/23/2023 10/23/2023 5 Suboxone 8 Mg-2 Mg Sl Film 30.00 10 Ka Par [id number] Marc ([id number]) 0/0 24.00 mg [place]   09/18/2023 09/18/2023 5 Suboxone 8 Mg-2 Mg Sl Film 90.00 30 Ka Par [id number] Fa         Medications:  buprenorphine HCl-naloxone HCl (SUBOXONE) 8-2 MG per film, Place 3 Film under the tongue daily  naloxone (NARCAN) 4 MG/0.1ML nasal spray, Spray 1 spray (4 mg) into one nostril alternating nostrils as needed for opioid reversal every 2-3 minutes until assistance arrives  nicotine (NICORETTE) 4 MG lozenge, Place 1 lozenge (4 mg) inside cheek every hour as needed for nicotine withdrawal symptoms  polyethylene glycol (MIRALAX) 17 GM/Dose powder, Take 17 g (1 Capful) by mouth daily as needed for constipation  prazosin (MINIPRESS) 1 MG capsule, Take 1 mg by mouth at bedtime  risperiDONE (RISPERDAL M-TABS) 1 MG ODT, Take 1 mg by mouth 2 times daily    No current  facility-administered medications on file prior to visit.      No Known Allergies    PMH, PSH, FamHx reviewed      OBJECTIVE                                                      BP (!) 161/86   Pulse 75     Physical Exam  Constitutional:       General: He is not in acute distress.     Appearance: Normal appearance.   HENT:      Head: Normocephalic and atraumatic.   Pulmonary:      Effort: Pulmonary effort is normal.   Neurological:      Mental Status: He is alert and oriented to person, place, and time.      Coordination: Coordination is intact.      Gait: Gait is intact.   Psychiatric:         Attention and Perception: Attention and perception normal.         Mood and Affect: Mood and affect normal.         Speech: Speech normal.         Behavior: Behavior normal. Behavior is cooperative.         Thought Content: Thought content normal.      Comments: Judgment/insight fair to good         Labs:    UDS:    Lab Results   Component Value Date    BUP Screen Positive (A) 03/20/2024    BZO Negative 03/20/2024    BAR Negative 03/20/2024    CLIFFORD Negative 03/20/2024    MAMP Negative 03/20/2024    AMP Negative 03/20/2024    MDMA Negative 03/20/2024    MTD Negative 03/20/2024    [id number] Negative 03/20/2024    OXY Negative 03/20/2024    PCP Negative 03/20/2024    THC Negative 03/20/2024    TEMP 90 F 03/20/2024    SGPOCT 1.025 03/20/2024     *POC urine drug screen does not screen for Fentanyl      Recent Results (from the past 240 hour(s))   Drugs of Abuse Screen Urine (POC CUPS) POCT    Collection Time: 03/20/24 11:14 AM   Result Value Ref Range    POCT Kit Lot Number [id number]     POCT Kit Expiration Date 75275948     Temperature Urine POCT 90 F 90 F, 92 F, 94 F, 96 F, 98 F, 100 F    Specific [place] POCT 1.025 1.005, 1.015, 1.025    pH Qual Urine POCT 5 pH 4 pH, 5 pH, 7 pH, 9 pH    Creatinine Qual Urine POCT 50 mg/dL 20 mg/dL, 50 mg/dL, 100 mg/dL, 200 mg/dL    Internal QC Qual Urine POCT Valid Valid    Amphetamine Qual Urine  POCT Negative Negative    Barbiturate Qual Urine POCT Negative Negative    Buprenorphine Qual Urine POCT Screen Positive (A) Negative    Benzodiazepine Qual Urine POCT Negative Negative    Cocaine Qual Urine POCT Negative Negative    Methamphetamine Qual Urine POCT Negative Negative    MDMA Qual Urine POCT Negative Negative    Methadone Qual Urine POCT Negative Negative    Opiate Qual Urine POCT Negative Negative    Oxycodone Qual Urine POCT Negative Negative    Phencyclidine Qual Urine POCT Negative Negative    THC Qual Urine POCT Negative Negative         Kassie Mckinley MD  Addiction Medicine  [place] [id number] [address] [id number] [phone]

## 2024-03-20 NOTE — NURSING NOTE
[facility]      Rooming information:  Approximate last use of full opioid agonist: 2/19/24  Taking buprenorphine? Yes:   How much per day? 24mg  Number of buprenorphine films/tablets remaining currently: unsure  Side effects related to buprenorphine (constipation, dry mouth, sedation?) constipation  Narcan currently available: Yes  Other recent substance use:    Denies  NICOTINE-Yes: cigarettes  If using nicotine, ready to quit? Yes:     Point of care urine drug screen positive for:  Lab Results   Component Value Date    BUP Screen Positive (A) 03/20/2024    BZO Negative 03/20/2024    BAR Negative 03/20/2024    CLIFFORD Negative 03/20/2024    MAMP Negative 03/20/2024    AMP Negative 03/20/2024    MDMA Negative 03/20/2024    MTD Negative 03/20/2024    [id number] Negative 03/20/2024    OXY Negative 03/20/2024    PCP Negative 03/20/2024    THC Negative 03/20/2024    TEMP 90 F 03/20/2024    SGPOCT 1.025 03/20/2024       *POC urine drug screen does not screen for Fentanyl          3/20/2024    11:00 AM   PHQ Assesment Total Score(s)   PHQ-9 Score 11       If PHQ-9 score of 15 or higher, has Recovery [facility] therapist or provider been notified? No    Any current suicidal ideation? No  If yes, has [facility] therapist or provider been notified? No    Primary care provider: [facility]     Mental health provider: [facility]  (follow up on MH referral if needed)    Insurance needs: active    Housing needs: at [facility]    Current legal issues: none    Contact information up to date? yes    3rd Party Involvement not today (please obtain RAMIRO if pt would like to include)    Orville Kent MA  March 20, 2024  11:01 AM

## 2024-03-27 LAB
BUPRENORPHINE UR CFM-MCNC: 355 NG/ML
BUPRENORPHINE/CREAT UR: 514 NG/MG {CREAT}
NALOXONE UR CFM-MCNC: 1478 NG/ML
NALOXONE: 2142 NG/MG {CREAT}
NORBUPRENORPHINE UR CFM-MCNC: 1660 NG/ML
NORBUPRENORPHINE/CREAT UR: 2406 NG/MG {CREAT}

## 2024-04-16 ENCOUNTER — OFFICE VISIT (OUTPATIENT)
Dept: BEHAVIORAL HEALTH | Facility: CLINIC | Age: 49
End: 2024-04-16
Payer: COMMERCIAL

## 2024-04-16 VITALS — HEART RATE: 89 BPM | SYSTOLIC BLOOD PRESSURE: 107 MMHG | DIASTOLIC BLOOD PRESSURE: 67 MMHG

## 2024-04-16 DIAGNOSIS — F33.41 RECURRENT MAJOR DEPRESSIVE DISORDER, IN PARTIAL REMISSION (H): ICD-10-CM

## 2024-04-16 DIAGNOSIS — F43.10 PTSD (POST-TRAUMATIC STRESS DISORDER): ICD-10-CM

## 2024-04-16 DIAGNOSIS — Z51.81 ENCOUNTER FOR MONITORING OPIOID MAINTENANCE THERAPY: ICD-10-CM

## 2024-04-16 DIAGNOSIS — F29 PSYCHOSIS, UNSPECIFIED PSYCHOSIS TYPE (H): ICD-10-CM

## 2024-04-16 DIAGNOSIS — K08.89 PAIN, DENTAL: ICD-10-CM

## 2024-04-16 DIAGNOSIS — Z79.891 ENCOUNTER FOR MONITORING OPIOID MAINTENANCE THERAPY: ICD-10-CM

## 2024-04-16 DIAGNOSIS — F17.200 TOBACCO USE DISORDER: ICD-10-CM

## 2024-04-16 DIAGNOSIS — F11.20 OPIOID USE DISORDER, SEVERE, DEPENDENCE (H): Primary | ICD-10-CM

## 2024-04-16 DIAGNOSIS — Z59.819 HOUSING INSECURITY: ICD-10-CM

## 2024-04-16 LAB
AMPHETAMINE QUAL URINE POCT: NEGATIVE
BARBITURATE QUAL URINE POCT: NEGATIVE
BENZODIAZEPINE QUAL URINE POCT: NEGATIVE
BUPRENORPHINE QUAL URINE POCT: ABNORMAL
COCAINE QUAL URINE POCT: NEGATIVE
CREATININE QUAL URINE POCT: ABNORMAL
INTERNAL QC QUAL URINE POCT: ABNORMAL
MDMA QUAL URINE POCT: NEGATIVE
METHADONE QUAL URINE POCT: NEGATIVE
METHAMPHETAMINE QUAL URINE POCT: NEGATIVE
OPIATE QUAL URINE POCT: NEGATIVE
OXYCODONE QUAL URINE POCT: NEGATIVE
PH QUAL URINE POCT: ABNORMAL
PHENCYCLIDINE QUAL URINE POCT: NEGATIVE
POCT KIT EXPIRATION DATE: ABNORMAL
POCT KIT LOT NUMBER: ABNORMAL
SPECIFIC GRAVITY POCT: 1.01
TEMPERATURE URINE POCT: ABNORMAL
THC QUAL URINE POCT: NEGATIVE

## 2024-04-16 PROCEDURE — 99215 OFFICE O/P EST HI 40 MIN: CPT | Performed by: FAMILY MEDICINE

## 2024-04-16 PROCEDURE — 80305 DRUG TEST PRSMV DIR OPT OBS: CPT | Performed by: FAMILY MEDICINE

## 2024-04-16 RX ORDER — BUPRENORPHINE AND NALOXONE 8; 2 MG/1; MG/1
3 FILM, SOLUBLE BUCCAL; SUBLINGUAL DAILY
Qty: 90 FILM | Refills: 0 | Status: SHIPPED | OUTPATIENT
Start: 2024-04-16

## 2024-04-16 SDOH — ECONOMIC STABILITY - HOUSING INSECURITY: HOUSING INSTABILITY UNSPECIFIED: Z59.819

## 2024-04-16 ASSESSMENT — PATIENT HEALTH QUESTIONNAIRE - PHQ9: SUM OF ALL RESPONSES TO PHQ QUESTIONS 1-9: 8

## 2024-04-16 NOTE — PROGRESS NOTES
[facility] Follow Up    ASSESSMENT/PLAN                                                    1. Opioid use disorder, severe, dependence (H)  Reporting control of symptoms.   Continue buprenorphine 24mg/day  Additional naloxone prescribed  - Drugs of Abuse Screen Urine (POC CUPS) POCT  - buprenorphine HCl-naloxone HCl (SUBOXONE) 8-2 MG per film; Place 3 Film under the tongue daily  Dispense: 90 Film; Refill: 0  - naloxone (NARCAN) 4 MG/0.1ML nasal spray; Spray 1 spray (4 mg) into one nostril alternating nostrils as needed for opioid reversal every 2-3 minutes until assistance arrives  Dispense: 0.2 mL; Refill: 11    2. Encounter for monitoring opioid maintenance therapy  - Drugs of Abuse Screen Urine (POC CUPS) POCT    3. Tobacco use disorder  Has nicotine lozenges, not interested in quitting at this time    4. Housing insecurity  Referral to CC to assist with housing  - Specialty Care Coordination Referral; Future    5. PTSD (post-traumatic stress disorder)  Prescribed prazosin by psychiatry.  C/o recurrent nightmares.  Recommend he contact psychiatrists office to discuss symptoms, he agreed.     6. Recurrent major depressive disorder, in partial remission (H24)  Pt is prescribed sertraline and risperidone by psychiatry, follow-up immediately regarding new symptoms as described below, and as recommended.     7. Psychosis, unspecified psychosis type (H)  Pt is prescribed risperidone by psychiatry.   Pt with recurrence of seeing shadows in R peripheral vision, now associated with hearing voices.  No command hallucinations.   Recommend he contact his psychiatrist to discuss symptoms.  Pt received a call during our visit that was apparently his psychiatrist's office staff calling him, pt was reportedly told the clinic would call him back.  He agreed to follow-up with clinic.     8. Pain, dental  Given contact information for [facility] dental clinic, recommend he present to ED for worsening or new  symptoms      Return in about 4 weeks (around 5/14/2024).    Patient counseling completed today:  Discussed mechanism of action, potential risks/benefits/side effects of medications and other recommendations above.     Harm reduction counseling including never use alone, availability of naloxone, risks associated with concurrent use of opioids and benzodiazepines, alcohol, or other sedatives, safer administration as applicable.  Discussed importance of avoiding isolation, building a network of supportive relationships, avoiding people/places/things associated with past use to reduce risk of relapse; including motivational interviewing regarding psychosocial interventions.     SUBJECTIVE                                                          CC/HPI:  Rm Merchant is a 49 year old male with PMH hepatitis C with evidence of spontaneous resolution, MDD, PTSD, polysubstance use disorder including opioids on buprenorphine who presents to the Recovery Clinic for return visit.       Brief History:  Rm Merchant was first seen in Recovery Clinic on 08/10/23. They were referred by staff at [facility] due to difficulties filling his most recent rx for buprenorphine through [employer].     Lost to follow up after 11/23/23.  RTC 3/8/24 following ~1 month of return to use of fentanyl.  Resumed buprenorphine 24mg/day.  In programming at [facility].     4/16/24 HPI:  Pt returns for follow up, last seen in clinic 3/22/24.  Reports he has continued to take buprenorphine 24mg/day as prescribed.  Denies opioid cravings or return to use.    He missed his initial PCP appointment on 4/10/24 and would like to reschedule.  He is complaining of pain around tooth L side of mouth and would also like to see a dentist.  No fevers.  Hurts to chew on L side.    He has been having difficulty finding housing and requests assistance with this.    C/o 2 weeks of occasionally seeing shadows in R peripheral vision associated  with hearing unintelligible voices.  Denies headaches, vision loss, nausea, new head injury.  Review of psych intake w/ [facility] 7/2023 shows pt was seeing shadows at that time and was also experiencing this while in jail prior to his release in early 2023.    Pt also c/o recurring nightmares.    He is scheduled for follow-up with his psychiatrist in one month.        Substance Use History :  Opioids:   Age at first use: 26 years heroin, use increased to daily while incarcerated x21 years.  Released Spring 2023.   Current use: substance: fentanyl; quantity alot; route: insufflation ; timing of last use: 2/19/2023                 IV drug use: No   History of overdose: No  Previous residential or outpatient treatments for addiction : Yes: [address] 6/7/23 completed residential then started OP  Previous medication treatments for addiction: Yes: suboxone  Longest period of sobriety: 2012 to present  Medical complications related to substance use: No  Hepatitis C: 3/8/24 HCV RNA not detected  HIV: 3/8/24 HIV ag/ab nonreactive     Other Addiction History:  Stimulants   Cocaine 2010 last use  Sedatives/hypnotics/anxiolytics:   no  Alcohol:   Past including during incarceration  Nicotine:   Yes, cigarettes 1/4 ppd and some vaping  Cannabis:   past  Hallucinogens/Dissociatives:   no  Eating disorder:  no  Gambling:              Yes, past        PAST PSYCHIATRIC HISTORY:  Diagnoses- PTSD, depression,anxiety  Suicide Attempts: No   Hospitalizations: No         3/8/2024    10:00 AM 3/20/2024    11:00 AM 4/16/2024     3:00 PM   PHQ   PHQ-9 Total Score 8 11 8   Q9: Thoughts of better off dead/self-harm past 2 weeks Not at all Not at all Not at all       Social History  Housing status: residential at [facility]  Relationship status: Single  Children: no children  Legal: parole 2025      [place] Prescription Drug Monitoring Program Reviewed:  Yes; as expected  03/21/2024 03/20/2024 5 Suboxone 8 Mg-2 Mg Sl Film 90.00 30 Li Vol [id number] Marc  (7342) 0/0 24.00 mg Medicaid [place]     Medications:  Current Outpatient Medications   Medication Sig Dispense Refill    buprenorphine HCl-naloxone HCl (SUBOXONE) 8-2 MG per film Place 3 Film under the tongue daily 90 Film 0    naloxone (NARCAN) 4 MG/0.1ML nasal spray Spray 1 spray (4 mg) into one nostril alternating nostrils as needed for opioid reversal every 2-3 minutes until assistance arrives 0.2 mL 11    nicotine (NICORETTE) 4 MG lozenge Place 1 lozenge (4 mg) inside cheek every hour as needed for nicotine withdrawal symptoms 200 lozenge 3    polyethylene glycol (MIRALAX) 17 GM/Dose powder Take 17 g (1 Capful) by mouth daily as needed for constipation 578 g 11    prazosin (MINIPRESS) 1 MG capsule Take 2 capsules (2 mg) by mouth at bedtime      risperiDONE (RISPERDAL M-TABS) 1 MG ODT Take 2 tablets (2 mg) by mouth daily      sertraline (ZOLOFT) 50 MG tablet Take 50 mg by mouth daily       No current facility-administered medications for this visit.       No Known Allergies    PMH, PSH, FamHx reviewed      OBJECTIVE                                                      /67 (BP Location: Left arm, Patient Position: Sitting, Cuff Size: Adult Regular)   Pulse 89     Physical Exam  Constitutional:       General: He is not in acute distress.     Appearance: Normal appearance.   HENT:      Head: Normocephalic and atraumatic.   Pulmonary:      Effort: Pulmonary effort is normal.   Neurological:      Mental Status: He is alert and oriented to person, place, and time.      Coordination: Coordination is intact.      Gait: Gait is intact.   Psychiatric:         Attention and Perception: Attention normal. He perceives auditory and visual hallucinations.         Mood and Affect: Mood and affect normal.         Speech: Speech normal.         Behavior: Behavior normal. Behavior is cooperative.         Thought Content: Thought content normal. Thought content does not include homicidal or suicidal ideation.      Comments:  Judgment/insight fair to good         Labs:    UDS:    Lab Results   Component Value Date    BUP Screen Positive (A) 03/20/2024    BZO Negative 03/20/2024    BAR Negative 03/20/2024    CLIFFORD Negative 03/20/2024    MAMP Negative 03/20/2024    AMP Negative 03/20/2024    MDMA Negative 03/20/2024    MTD Negative 03/20/2024    HRS407 Negative 03/20/2024    OXY Negative 03/20/2024    PCP Negative 03/20/2024    THC Negative 03/20/2024    TEMP 90 F 03/20/2024    SGPOCT 1.025 03/20/2024     *POC urine drug screen does not screen for Fentanyl      Recent Results (from the past 240 hour(s))   Drugs of Abuse Screen Urine (POC CUPS) POCT    Collection Time: 04/16/24  3:05 PM   Result Value Ref Range    POCT Kit Lot Number [id number]     POCT Kit Expiration Date 2025-10-23     Temperature Urine POCT 92 F 90 F, 92 F, 94 F, 96 F, 98 F, 100 F    Specific [place] POCT 1.015 1.005, 1.015, 1.025    pH Qual Urine POCT 5 pH 4 pH, 5 pH, 7 pH, 9 pH    Creatinine Qual Urine POCT 50 mg/dL 20 mg/dL, 50 mg/dL, 100 mg/dL, 200 mg/dL    Internal QC Qual Urine POCT Valid Valid    Amphetamine Qual Urine POCT Negative Negative    Barbiturate Qual Urine POCT Negative Negative    Buprenorphine Qual Urine POCT Screen Positive (A) Negative    Benzodiazepine Qual Urine POCT Negative Negative    Cocaine Qual Urine POCT Negative Negative    Methamphetamine Qual Urine POCT Negative Negative    MDMA Qual Urine POCT Negative Negative    Methadone Qual Urine POCT Negative Negative    Opiate Qual Urine POCT Negative Negative    Oxycodone Qual Urine POCT Negative Negative    Phencyclidine Qual Urine POCT Negative Negative    THC Qual Urine POCT Negative Negative     At least 40min spent in review of medical record,  review, obtaining histories, discussing recommendations, counseling, coordination of care      Kassie Mckinley MD  Addiction Medicine  [place] [id number] [place] [id number] [phone]

## 2024-04-16 NOTE — PATIENT INSTRUCTIONS
[facility]  [phone]  [address] [place] [id number]

## 2024-04-16 NOTE — NURSING NOTE
[facility]      Rooming information:  Approximate last use of full opioid agonist: 02/19/2024  Taking buprenorphine? Yes:   How much per day? 24 mg  Number of buprenorphine films/tablets remaining currently: 4 days worth   Side effects related to buprenorphine (constipation, dry mouth, sedation?) No   Narcan currently available: Yes  Other recent substance use:    Denies  NICOTINE-Yes: cigarettes  If using nicotine, ready to quit? No    Point of care urine drug screen positive for:  Lab Results   Component Value Date    BUP Screen Positive (A) 03/20/2024    BZO Negative 03/20/2024    BAR Negative 03/20/2024    CLIFFORD Negative 03/20/2024    MAMP Negative 03/20/2024    AMP Negative 03/20/2024    MDMA Negative 03/20/2024    MTD Negative 03/20/2024    TQG875 Negative 03/20/2024    OXY Negative 03/20/2024    PCP Negative 03/20/2024    THC Negative 03/20/2024    TEMP 90 F 03/20/2024    SGPOCT 1.025 03/20/2024       *POC urine drug screen does not screen for Fentanyl        4/16/2024     3:00 PM   PHQ Assesment Total Score(s)   PHQ-9 Score 8       If PHQ-9 score of 15 or higher, has Recovery Clinic therapist or provider been notified? No    Any current suicidal ideation? No  If yes, has [facility] therapist or provider been notified? N/A    Primary care provider: [facility] provider: Yes (follow up on MH referral if needed)    Housing needs: [place] Yes Pt has needs help with housing    Current legal issues: None    Contact information up to date? yes    3rd Party Involvement not today (please obtain RAMIRO if pt would like to include)    Dianne Craven MA  April 16, 2024  3:02 PM

## 2024-04-17 ENCOUNTER — PATIENT OUTREACH (OUTPATIENT)
Dept: CARE COORDINATION | Facility: CLINIC | Age: 49
End: 2024-04-17
Payer: COMMERCIAL

## 2024-04-17 NOTE — PROGRESS NOTES
Clinic Care Coordination Contact  [facility]/Voicemail    Clinical Data: Care Coordinator Outreach    Outreach Documentation Number of Outreach Attempt   4/17/2024   4:01 PM 1       Left message on patient's voicemail with call back information and requested return call.    Plan: Care Coordinator will send care coordination introduction letter with care coordinator contact information and explanation of care coordination services via [employer]. Care Coordinator will try to reach patient again in 1-2 business days.    TRANG Jay? Social Work Care Coordinator   [facility]  [email or web address]? [email or web address]    Phone: [phone]  she/her

## 2024-04-17 NOTE — LETTER
[facility] CARE COORDINATION  [facility]  April 17, 2024    Edgar Pardeep Lozano  [address] [place] [address]      Dear Edgar,    I am a clinic care coordinator who works with Kassie Mckinley MD with the [facility]. I wanted to introduce myself and provide you with my contact information for you to be able to call me with any questions or concerns. Below is a description of clinic care coordination and how I can further assist you.       The clinic care coordination team is made up of a registered nurse and  who understand the health care system. The goal of clinic care coordination is to help you manage your health and improve access to the health care system. Our team works alongside your provider to assist you in determining your health and social needs. We can help you obtain health care and community resources, providing you with necessary information and education. We can work with you through any barriers and develop a care plan that helps coordinate and strengthen the communication between you and your care team.  Our services are voluntary and are offered without charge to you personally.    Please feel free to contact me with any questions or concerns regarding care coordination and what we can offer.      We are focused on providing you with the highest-quality healthcare experience possible.    Sincerely,     TRANG Jay? Social Work Care Coordinator   [facility]  [email or web address]? [email or web address]    Phone: [phone]  she/her

## 2024-04-19 ENCOUNTER — PATIENT OUTREACH (OUTPATIENT)
Dept: CARE COORDINATION | Facility: CLINIC | Age: 49
End: 2024-04-19

## 2024-04-19 NOTE — PROGRESS NOTES
Clinic Care Coordination Contact  Clinic Care Coordination Contact  OUTREACH    Referral Information:  Referral Source: Other, specify (Kassie Mckinley MD)    Primary Diagnosis: Behavioral Health    Chief Complaint   Patient presents with    Clinic Care Coordination - Initial        Universal Utilization:      Utilization      No Show Count (past year)  6             ED Visits  3             Hospital Admissions  0                    Current as of: 4/19/2024 12:00 AM                Clinical Concerns:  Current Medical Concerns:      Current Behavioral Concerns:     Education Provided to patient: Housing, [facility] providers      Health Maintenance Reviewed:    Clinical Pathway:     Medication Management:  Medication review status:        Functional Status:       Living Situation:       Lifestyle & Psychosocial Needs:    Social Determinants of Health     Food Insecurity: Food Insecurity Present (7/7/2023)    Received from [facility], [facility]    Food Insecurity     Worried About Running Out of Food in the Last Year: 2   Depression: Not at risk (4/16/2024)    PHQ-2     PHQ-2 Score: 2   Housing Stability: High Risk (7/7/2023)    [facility], [facility]    Housing Stability     Unable to Pay for Housing in the Last Year: 3   Tobacco Use: High Risk (4/16/2024)    Patient History     Smoking Tobacco Use: Every Day     Smokeless Tobacco Use: Never     Passive Exposure: Not on file   Financial Resource Strain: Medium Risk (7/7/2023)    [facility], [facility]    Financial Resource Strain     Difficulty of Paying Living Expenses: Not on file     Difficulty of Paying Living Expenses: 3   Alcohol Use: Not At Risk (5/30/2023)    Received from [facility], [facility]    AUDIT-C      Frequency of Alcohol Consumption: Never     Average Number of Drinks: Patient does not drink     Frequency of Binge Drinking: Never   Transportation Needs: Unmet Transportation Needs (7/7/2023)    Received from [facility], [facility]    Transportation Needs     Lack of Transportation (Medical): 2   Physical Activity: Inactive (5/30/2023)    Received from [facility]    Exercise Vital Sign     Days of Exercise per Week: 0 days     Minutes of Exercise per Session: 0 min   Interpersonal Safety: Not At Risk (5/30/2023)    Received from [facility]    Humiliation, Afraid, Rape, and Kick questionnaire     Fear of Current or Ex-Partner: No     Emotionally Abused: No     Physically Abused: No     Sexually Abused: No   Stress: Stress Concern Present (5/30/2023)    Received from [facility]    [place] [employer] - Occupational Stress Questionnaire     Feeling of Stress : Very much   Social Connections: Socially Isolated (7/7/2023)    Received from [facility], [facility]    Social Connections     Frequency of Communication with Friends and Family: 4   Health Literacy: Not on file          MARCUS ANDREWS followed up with pt on regarding referral for resources for housing. Pt reports that he is currently staying at [facility] but will be out on April 30. Pt states that he will probably go into sober housing but is interested in getting a new place after that. MARCUS ANDREWS said she could send housing resources to pt to look through and pt agreed to this. Pt also requested resource for how to find a provider through [facility]. MARCUS ANDREWS went through this and said she will send [facility] website with providers/appts.          Resources and Interventions:  Current Resources:       [facility] [place]  Providers  https://providers.WazzapfairNanoference.org/search?_ga=[email or web address]    [place] Housing Help  https://[place]EmailFilm Technologies.GameFly/index.html    Housing Link:  https://EmailFilm Technologieslink.org/    Public housing:  https://www.Disrupt6.[facility]/[place]    [employer]skissnofrog or For Rent By Owner:   -Websites for rental opportunities with private landlords.  https://[place].Audingo.org/  https://www.KeyEffx/    Commonbond:   -View and apply for affordable housing opportunities throughout the [facility] area.  https://SensingStrip.org/    [employer]  -The [place] Department of [employer] maintains a rural vacancy list and many of the properties offer project-based subsidies (where the tenant pays 30 percent of their monthly income toward rent).   http://www.AuraSense Therapeutics.net/vacancy.asp              The patient consented via Verbal consent to have contact information and resources sent via email in an unencrypted manner.     Care Plan:  Care Plan: Housing Instability       Problem: SDOH LACK OF STABLE HOUSING       Goal: Establish Stable Housing       This Visit's Progress: 10%    Priority: Medium    Note:     Barriers: availability of housing   Strengths: strong advocate of self   Patient expressed understanding of goal: yes  Action steps to achieve this goal:  1. I will look into resources for housing sent by MARCUS ANDREWS   2. I will look into other options for housing as well such as sober housing  3. I will follow up with MARCUS ANDREWS on a monthly basis to follow up on any questions/resources I need                                  Patient/Caregiver understanding: Patient verbalized understanding, engaged in AIDET communication during patient encounter.    Outreach Frequency: 2 weeks, more frequently as needed  Future Appointments                In 3 weeks Kassie Mckinley MD [facility]            Plan: MARCUS ANDREWS will follow up with pt in 2 weeks    TRANG Jay?  Social Work Care Coordinator   [facility]  [email or web address]? [email or web address]    Phone: [phone]  she/her

## 2024-05-03 ENCOUNTER — PATIENT OUTREACH (OUTPATIENT)
Dept: CARE COORDINATION | Facility: CLINIC | Age: 49
End: 2024-05-03
Payer: COMMERCIAL

## 2024-05-03 NOTE — PROGRESS NOTES
Clinic Care Coordination Contact  Follow Up Progress Note      Assessment: MARCUS ANDREWS followed up with pt on how he was doing and previous resources. Pt reports he is focused on finding a place for dental care covered by [facility]. MARCUS CC reports she could send pt options. Pt did not need any further resources today.     Care Gaps:    Health Maintenance Due   Topic Date Due    NICOTINE/TOBACCO CESSATION COUNSELING Q 1 YR  Never done    YEARLY PREVENTIVE VISIT  Never done    ADVANCE CARE PLANNING  Never done    DEPRESSION ACTION PLAN  Never done    Pneumococcal Vaccine: Pediatrics (0 to 5 Years) and At-Risk Patients (6 to 64 Years) (1 of 2 - PCV) Never done    COLORECTAL CANCER SCREENING  Never done    HEPATITIS A IMMUNIZATION (1 of 2 - Risk 2-dose series) Never done    HEPATITIS B IMMUNIZATION (1 of 3 - 19+ 3-dose series) Never done    LIPID  Never done    COVID-19 Vaccine (3 - 2023-24 season) 09/01/2023       Care Plans  Care Plan: Housing Instability       Problem: SDOH LACK OF STABLE HOUSING       Goal: Establish Stable Housing       This Visit's Progress: 10%    Priority: Medium    Note:     Barriers: availability of housing   Strengths: strong advocate of self   Patient expressed understanding of goal: yes  Action steps to achieve this goal:  1. I will look into resources for housing sent by MARCUS ANDREWS   2. I will look into other options for housing as well such as sober housing  3. I will follow up with MARCUS ANDREWS on a monthly basis to follow up on any questions/resources I need                                  Intervention/Education provided during outreach: Dental    [facility]- Dental    [email or web address]    [email or web address]       Outreach Frequency: monthly, more frequently as needed    Plan:   Care Coordinator will follow up in one month    TRANG Jay? Social Work Care Coordinator   [facility]  [email or web address]? [email or web address]    Phone: [phone]  she/her

## 2024-05-12 ENCOUNTER — HEALTH MAINTENANCE LETTER (OUTPATIENT)
Age: 49
End: 2024-05-12

## 2024-06-03 ENCOUNTER — PATIENT OUTREACH (OUTPATIENT)
Dept: CARE COORDINATION | Facility: CLINIC | Age: 49
End: 2024-06-03
Payer: COMMERCIAL

## 2024-06-03 NOTE — LETTER
[facility]  Patient Centered Plan of Care  About Me:        Patient Name:  Edgar Lozano    YOB: 1975  Age:         49 year old   [place] MRN:    [id number] Telephone Information:  Home Phone [phone]   Mobile [phone]   Home Phone [phone]       Address:  [address] [facility] [address] Email address:  [email or web address]      Emergency Contact(s)    Name Relationship Lgl Grd Work Phone Home Phone Mobile Phone           Primary language:  English     needed? No   [place] [facility]:  [phone] op. 1  Other communication barriers:No data recorded  Preferred Method of Communication:     Current living arrangement: No data recorded  Mobility Status/ Medical Equipment: No data recorded      Health Maintenance  Health Maintenance Reviewed: No data recorded    My Access Plan  Medical Emergency 911   Primary Clinic Line [facility] - [phone]   24 Hour Appointment Line [phone] or  [phone]) (toll-free)   24 Hour Nurse Line [phone] (toll-free)   Preferred Urgent Care No data recorded   Preferred Hospital No data recorded   Preferred [facility] [place] [place] [facility]     Behavioral Health Crisis Line The National Suicide Prevention Lifeline at 1-979.311.9836 or Text/Call 808           My Care Team Members  Patient Care Team         Relationship Specialty Notifications Start End    Clinic - [facility] PCP - General Clinic  2/22/24     PER PT HE GOES TO [place] FOR PRIMARY CARE    Phone: [phone] Fax: [phone]         [address] [place] [address]    Wandy Rivera NP Assigned Behavioral Health Provider   1/25/24     Phone: [phone] Fax: [phone]         [facility] [address] [place] [address]    Rhea Escoto LSW Specialty Care Coordinator  Admissions 4/17/24                  My Care Plans  Self Management and Treatment Plan    Care Plan  Care Plan: Housing Instability       Problem: SDOH LACK OF STABLE HOUSING       Goal: Establish Stable Housing       This Visit's Progress: 10%    Priority: Medium    Note:     Barriers: availability of housing   Strengths: strong advocate of self   Patient expressed understanding of goal: yes  Action steps to achieve this goal:  1. I will look into resources for housing sent by MARCUS ANDREWS   2. I will look into other options for housing as well such as sober housing  3. I will follow up with MARCUS ANDREWS on a monthly basis to follow up on any questions/resources I need                                  Action Plans on File:                       Advance Care Plans/Directives:             My Medical and Care Information  Problem List   There is no problem list on file for this patient.     Current Medications and Allergies:  See printed Medication Report.    Care Coordination Start Date: No linked episodes   Frequency of Care Coordination: monthly, more frequently as needed     Form Last Updated: 07/19/2024

## 2024-06-03 NOTE — LETTER
[facility] COORDINATION  Mental Health and Addiction   July 3, 2024    Edgar Roberto Karly Lozano  [address] [facility] [address]      Dear Edgar,    I have been attempting to reach you since our last contact. I would like to continue to work with you and provide any additional support you may need on achieving your health care related goals. I would appreciate if you would give me a call at [phone] to let me know if you would like to continue working together. I know that there are many things that can affect our ability to communicate and I hope we can continue to work together.    All of us at the [facility] and Addiction Clinic are invested in your health and are here to assist you in meeting your goals.     Sincerely,    TRANG Jay

## 2024-06-03 NOTE — PROGRESS NOTES
Clinic Care Coordination Contact  [facility]/Voicemail    Clinical Data: Care Coordinator Outreach    Outreach Documentation Number of Outreach Attempt   6/3/2024   1:05 PM 1       Left message on patient's voicemail with call back information and requested return call.    Plan: Care Coordinator will try to reach patient again in 1 month.    TRANG Jay? Social Work Care Coordinator   [facility]  [email or web address]? [email or web address]    Phone: [phone]  she/her

## 2024-07-03 NOTE — PROGRESS NOTES
Clinic Care Coordination Contact  [facility]    Clinical Data: Care Coordinator Outreach    Outreach Documentation Number of Outreach Attempt   6/3/2024   1:05 PM 1   7/3/2024  12:10 PM 2       SW CC unable to leave message as VM was not available.     Plan: Care Coordinator will send unable to contact letter with care coordinator contact information via [employer]. Care Coordinator will try to reach patient again in 1 month.    TRANG Jay? Social Work Care Coordinator   [facility]  [email or web address]? [email or web address]    Phone: [phone]  she/her

## 2024-08-07 NOTE — PROGRESS NOTES
Clinic Care Coordination Contact  [facility]    Clinical Data: Care Coordinator Outreach    Outreach Documentation Number of Outreach Attempt   7/3/2024  12:10 PM 2   8/7/2024  12:06 PM 3       SW CC unable to leave message as VM was not available.     Plan: Care Coordinator will do no further outreaches at this time.    TRANG Jay? Social Work Care Coordinator   [facility]  [email or web address]? [email or web address]    Phone: [phone]  she/her

## 2024-10-10 ENCOUNTER — TELEPHONE (OUTPATIENT)
Dept: BEHAVIORAL HEALTH | Facility: CLINIC | Age: 49
End: 2024-10-10

## 2024-10-10 ENCOUNTER — OFFICE VISIT (OUTPATIENT)
Dept: BEHAVIORAL HEALTH | Facility: CLINIC | Age: 49
End: 2024-10-10
Payer: COMMERCIAL

## 2024-10-10 VITALS — HEART RATE: 68 BPM | SYSTOLIC BLOOD PRESSURE: 154 MMHG | OXYGEN SATURATION: 98 % | DIASTOLIC BLOOD PRESSURE: 105 MMHG

## 2024-10-10 DIAGNOSIS — Z51.81 ENCOUNTER FOR MONITORING OPIOID MAINTENANCE THERAPY: ICD-10-CM

## 2024-10-10 DIAGNOSIS — F10.90 ALCOHOL USE DISORDER: ICD-10-CM

## 2024-10-10 DIAGNOSIS — F11.20 OPIOID USE DISORDER, SEVERE, DEPENDENCE (H): Primary | ICD-10-CM

## 2024-10-10 DIAGNOSIS — L98.9 SKIN LESION: ICD-10-CM

## 2024-10-10 DIAGNOSIS — F11.93 OPIOID WITHDRAWAL (H): ICD-10-CM

## 2024-10-10 DIAGNOSIS — Z79.891 ENCOUNTER FOR MONITORING OPIOID MAINTENANCE THERAPY: ICD-10-CM

## 2024-10-10 LAB
AMPHETAMINE QUAL URINE POCT: NEGATIVE
BARBITURATE QUAL URINE POCT: NEGATIVE
BENZODIAZEPINE QUAL URINE POCT: NEGATIVE
BUPRENORPHINE QUAL URINE POCT: ABNORMAL
COCAINE QUAL URINE POCT: NEGATIVE
CREAT UR-MCNC: 167 MG/DL
CREATININE QUAL URINE POCT: ABNORMAL
INTERNAL QC QUAL URINE POCT: ABNORMAL
MDMA QUAL URINE POCT: NEGATIVE
METHADONE QUAL URINE POCT: NEGATIVE
METHAMPHETAMINE QUAL URINE POCT: NEGATIVE
OPIATE QUAL URINE POCT: NEGATIVE
OXYCODONE QUAL URINE POCT: NEGATIVE
PH QUAL URINE POCT: ABNORMAL
PHENCYCLIDINE QUAL URINE POCT: NEGATIVE
POCT KIT EXPIRATION DATE: ABNORMAL
POCT KIT LOT NUMBER: ABNORMAL
SPECIFIC GRAVITY POCT: 1.01
TEMPERATURE URINE POCT: ABNORMAL
THC QUAL URINE POCT: ABNORMAL

## 2024-10-10 PROCEDURE — G0481 DRUG TEST DEF 8-14 CLASSES: HCPCS | Mod: 59 | Performed by: FAMILY MEDICINE

## 2024-10-10 PROCEDURE — 80305 DRUG TEST PRSMV DIR OPT OBS: CPT | Performed by: FAMILY MEDICINE

## 2024-10-10 PROCEDURE — G2211 COMPLEX E/M VISIT ADD ON: HCPCS | Performed by: FAMILY MEDICINE

## 2024-10-10 PROCEDURE — 99214 OFFICE O/P EST MOD 30 MIN: CPT | Performed by: FAMILY MEDICINE

## 2024-10-10 PROCEDURE — H0038 SELF-HELP/PEER SVC PER 15MIN: HCPCS

## 2024-10-10 RX ORDER — BUPRENORPHINE AND NALOXONE 8; 2 MG/1; MG/1
3 FILM, SOLUBLE BUCCAL; SUBLINGUAL DAILY
Qty: 21 FILM | Refills: 0 | Status: SHIPPED | OUTPATIENT
Start: 2024-10-10 | End: 2024-10-16

## 2024-10-10 RX ORDER — MUPIROCIN 20 MG/G
OINTMENT TOPICAL 3 TIMES DAILY
Qty: 15 G | Refills: 0 | Status: SHIPPED | OUTPATIENT
Start: 2024-10-10

## 2024-10-10 RX ORDER — GABAPENTIN 300 MG/1
300 CAPSULE ORAL ONCE
Status: COMPLETED | OUTPATIENT
Start: 2024-10-10 | End: 2024-10-10

## 2024-10-10 RX ORDER — CLONIDINE HYDROCHLORIDE 0.1 MG/1
0.1 TABLET ORAL ONCE
Status: COMPLETED | OUTPATIENT
Start: 2024-10-10 | End: 2024-10-10

## 2024-10-10 RX ORDER — ONDANSETRON 4 MG/1
4 TABLET, ORALLY DISINTEGRATING ORAL EVERY 8 HOURS PRN
Qty: 8 TABLET | Refills: 0 | Status: SHIPPED | OUTPATIENT
Start: 2024-10-10 | End: 2024-10-16

## 2024-10-10 RX ORDER — GABAPENTIN 300 MG/1
300 CAPSULE ORAL 3 TIMES DAILY PRN
Qty: 15 CAPSULE | Refills: 0 | Status: SHIPPED | OUTPATIENT
Start: 2024-10-10 | End: 2024-10-16

## 2024-10-10 RX ORDER — CLONIDINE HYDROCHLORIDE 0.1 MG/1
0.1 TABLET ORAL 2 TIMES DAILY PRN
Qty: 6 TABLET | Refills: 0 | Status: SHIPPED | OUTPATIENT
Start: 2024-10-10 | End: 2024-10-16

## 2024-10-10 RX ADMIN — GABAPENTIN 300 MG: 300 CAPSULE ORAL at 10:19

## 2024-10-10 RX ADMIN — CLONIDINE HYDROCHLORIDE 0.1 MG: 0.1 TABLET ORAL at 10:17

## 2024-10-10 ASSESSMENT — PATIENT HEALTH QUESTIONNAIRE - PHQ9: SUM OF ALL RESPONSES TO PHQ QUESTIONS 1-9: 24

## 2024-10-10 ASSESSMENT — LIFESTYLE VARIABLES: TOTAL_SCORE: 11

## 2024-10-10 NOTE — PROGRESS NOTES
[facility] Recovery Clinic    Peer  met with Edgar Pardeep Brandt Michaelsissy in the Recovery Clinic to introduce himself, detail services provided and discuss current status of recovery. Pt appeared alert, oriented and open to feedback during our discussion.     Pt arrives for visit with provider for suboxone.  PRC sees patient today under provider diagnosis of opioid substance disorder, severe, dependence  (H)       Subjective (S)-  Pt stated he is glad he is here.  He has overdosed 11 times and pt reported that the reason natives overdose at 7 times the rate of whites is that natives like to drink alcohol with their meth and fentanyl.  It's a deadly mix Pt reports.    Objective (O)- Pt presented as agitated, anxious and physically withdrawing    Assessment (A)-N/A    Plan (P)-   PRC Nash shared lived experience and discussed a recovery plan    PRC provided business card to pt welcoming contact for recovery based support and resources. PRC and pt agree to speak again during an upcoming  visit.           Service Type:     Individual     Session Start Time: 9:50am                      Session End Time:10:09am        Session Length:    19minutes         Patient Goal:   To utilize suboxone assisted treatment for sobriety and long term recovery.     Goal Progress:   Ongoing.    Key Risk Factors to Recovery:   [facility] encourages being aware of risk factors that can lead to re-use which include avoiding isolation, avoiding triggers and managing cravings in a healthy manner. being open and willing to acceptance and change on a daily basis.  [facility] encourages pt to establish a sober network calling tree to reach out to when needed.  Continue to practice honesty with ourselves and trusted support person(s).   [facility] encourages regular attendance at recovery based meetings as well as finding a sponsor for mentoring and accountability.   [facility] encourages consideration of other services such as counseling for  mental health issues which can correlate with our substance use.      Support Needs:   Ongoing care, support and resources for opioid substance use disorder as well as other substances.     Follow up:   PRC has provided pt with his contact information for support and resource needs.    PRC and pt agree to meet during an upcoming  visit.       [facility]  [address], [address]   [place], [place], [address]  Clinic Phone: [phone]  Clinic Fax: [phone]  Peer  phone:  [phone]    Open Monday - Friday  9:00am-4:00pm  Walk in hours: 9am-3pm      Alex Cao  October 10, 2024  12:52 PM    LUAN Matta provides clinical oversite and supervision of care.
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
## 2024-10-10 NOTE — TELEPHONE ENCOUNTER
Received an RAMIRO from [facility] for communication exchange. RN sent RAMIRO to MR/HIMS to be scanned into the chart.     Verna Doyle RN on 10/10/2024 at 4:38 PM

## 2024-10-10 NOTE — NURSING NOTE
[facility] - Recovery Clinic      Rooming information:    Has been out on the streets past 6 months using. No follow-up with psychiatry in 6 months - [facility]     Have mild WD symptoms - mostly runny nose    DOC is alcohol.  Last drank 10/6/2024 and denies WD      Approximate last use of full opioid agonist: 10/6/2024 (fentanyl - used every 1-2 days,  methamphetamines and marijuana)  Taking buprenorphine? No      Narcan currently available: Yes  Other recent substance use:    Alcohol , Cannabis , Methamphetamine , and fentanyl   NICOTINE-Yes: cigs occasionally   If using nicotine, ready to quit? Yes: Trying    Point of care urine drug screen positive for:  Lab Results   Component Value Date    BUP Screen Positive (A) 10/10/2024    BZO Negative 10/10/2024    BAR Negative 10/10/2024    CLIFFORD Negative 10/10/2024    MAMP Negative 10/10/2024    AMP Negative 10/10/2024    MDMA Negative 10/10/2024    MTD Negative 10/10/2024    [id number] Negative 10/10/2024    OXY Negative 10/10/2024    PCP Negative 10/10/2024    THC Screen Positive (A) 10/10/2024    TEMP 90 F 10/10/2024    SGPOCT 1.015 10/10/2024       *POC urine drug screen does not screen for Fentanyl    Depression Response    Patient completed the PHQ-9 assessment for depression and scored >9? Yes  Question 9 on the PHQ-9 was positive for suicidality? No  Does patient have current mental health provider? No. Was at [facility] C-SSRS screener risk level.       Is this a virtual visit? No              10/10/2024     9:00 AM   PHQ Assesment Total Score(s)   PHQ-9 Score 24         Housing needs: [employer] - inpatient in [place]    Insurance: Active     Current legal issues: None    Contact information up to date? Yes    3rd Party Involvement: [place] [facility] ([facility])  (please obtain RAMIRO if pt would like to include)    Verna Doyle RN  October 10, 2024  9:35 AM

## 2024-10-10 NOTE — NURSING NOTE
Patient is exhibiting opioid withdrawal symptoms. Provider has ordered the following clinic administered medication (s) (CAM) to to be given:     COWS 11  /105  Pulse68    Administrations This Visit       cloNIDine (CATAPRES) tablet 0.1 mg       Admin Date  10/10/2024 Action  $Given Dose  0.1 mg Route  Oral Site   Documented By  Verna Doyle RN    NDC: 92873-913-52    Lot#: [id number]    : [employer]    Patient Supplied?: No              gabapentin (NEURONTIN) capsule 300 mg       Admin Date  10/10/2024 Action  $Given Dose  300 mg Route  Oral Site   Documented By  Verna Doyle RN    NDC: 79664-263-79    Lot#: [id number]    : [employer]    Patient Supplied?: No                      Medication(s) were given to the patient by RN utilizing the rights of medication administration (patient, drug, dose, time, route, and documentation).

## 2024-10-14 LAB
AMPHET UR CFM-MCNC: 198 NG/ML
AMPHET/CREAT UR: 119 NG/MG {CREAT}
BUPRENORPHINE UR CFM-MCNC: 9 NG/ML
BUPRENORPHINE/CREAT UR: 5 NG/MG {CREAT}
EDDP CTO UR SCN-MCNC: 781 NG/ML
EDDP/CREAT UR: 468 NG/MG {CREAT}
METHADONE UR CFM-MCNC: 378 NG/ML
METHADONE/CREAT UR: 226 NG/MG {CREAT}
METHAMPHET UR CFM-MCNC: 177 NG/ML
METHAMPHET/CREAT UR: 106 NG/MG {CREAT}
NORBUPRENORPHINE UR CFM-MCNC: 95 NG/ML
NORBUPRENORPHINE/CREAT UR: 57 NG/MG {CREAT}
NORFENTANYL UR CFM-MCNC: 5 NG/ML
NORFENTANYL/CREAT UR: 3 NG/MG {CREAT}

## 2024-10-16 ENCOUNTER — TELEPHONE (OUTPATIENT)
Dept: BEHAVIORAL HEALTH | Facility: CLINIC | Age: 49
End: 2024-10-16

## 2024-10-16 ENCOUNTER — OFFICE VISIT (OUTPATIENT)
Dept: BEHAVIORAL HEALTH | Facility: CLINIC | Age: 49
End: 2024-10-16
Payer: COMMERCIAL

## 2024-10-16 VITALS — SYSTOLIC BLOOD PRESSURE: 132 MMHG | DIASTOLIC BLOOD PRESSURE: 87 MMHG | HEART RATE: 87 BPM

## 2024-10-16 DIAGNOSIS — F11.93 OPIOID WITHDRAWAL (H): ICD-10-CM

## 2024-10-16 DIAGNOSIS — F15.90 STIMULANT USE DISORDER: ICD-10-CM

## 2024-10-16 DIAGNOSIS — Z79.891 ENCOUNTER FOR MONITORING OPIOID MAINTENANCE THERAPY: ICD-10-CM

## 2024-10-16 DIAGNOSIS — Z51.81 ENCOUNTER FOR MONITORING OPIOID MAINTENANCE THERAPY: ICD-10-CM

## 2024-10-16 DIAGNOSIS — F10.90 ALCOHOL USE DISORDER: ICD-10-CM

## 2024-10-16 DIAGNOSIS — F17.200 TOBACCO USE DISORDER: ICD-10-CM

## 2024-10-16 DIAGNOSIS — F11.20 OPIOID USE DISORDER, SEVERE, DEPENDENCE (H): Primary | ICD-10-CM

## 2024-10-16 LAB
AMPHETAMINE QUAL URINE POCT: NEGATIVE
BARBITURATE QUAL URINE POCT: NEGATIVE
BENZODIAZEPINE QUAL URINE POCT: NEGATIVE
BUPRENORPHINE QUAL URINE POCT: ABNORMAL
COCAINE QUAL URINE POCT: NEGATIVE
CREAT UR-MCNC: 200 MG/DL
CREATININE QUAL URINE POCT: ABNORMAL
INTERNAL QC QUAL URINE POCT: ABNORMAL
MDMA QUAL URINE POCT: NEGATIVE
METHADONE QUAL URINE POCT: NEGATIVE
METHAMPHETAMINE QUAL URINE POCT: NEGATIVE
OPIATE QUAL URINE POCT: NEGATIVE
OXYCODONE QUAL URINE POCT: ABNORMAL
PH QUAL URINE POCT: ABNORMAL
PHENCYCLIDINE QUAL URINE POCT: NEGATIVE
POCT KIT EXPIRATION DATE: ABNORMAL
POCT KIT LOT NUMBER: ABNORMAL
SPECIFIC GRAVITY POCT: 1.02
TEMPERATURE URINE POCT: ABNORMAL
THC QUAL URINE POCT: NEGATIVE

## 2024-10-16 PROCEDURE — G2211 COMPLEX E/M VISIT ADD ON: HCPCS | Performed by: FAMILY MEDICINE

## 2024-10-16 PROCEDURE — 80305 DRUG TEST PRSMV DIR OPT OBS: CPT | Performed by: FAMILY MEDICINE

## 2024-10-16 PROCEDURE — G0481 DRUG TEST DEF 8-14 CLASSES: HCPCS | Performed by: FAMILY MEDICINE

## 2024-10-16 PROCEDURE — 99214 OFFICE O/P EST MOD 30 MIN: CPT | Performed by: FAMILY MEDICINE

## 2024-10-16 RX ORDER — GABAPENTIN 600 MG/1
600 TABLET ORAL 3 TIMES DAILY
Qty: 45 TABLET | Refills: 0 | Status: SHIPPED | OUTPATIENT
Start: 2024-10-16 | End: 2024-10-30

## 2024-10-16 RX ORDER — CLONIDINE HYDROCHLORIDE 0.1 MG/1
0.1 TABLET ORAL 2 TIMES DAILY PRN
Qty: 30 TABLET | Refills: 0 | Status: SHIPPED | OUTPATIENT
Start: 2024-10-16 | End: 2024-10-30

## 2024-10-16 RX ORDER — GABAPENTIN 600 MG/1
600 TABLET ORAL 3 TIMES DAILY
Qty: 45 TABLET | Refills: 0 | Status: SHIPPED | OUTPATIENT
Start: 2024-10-16 | End: 2024-10-16

## 2024-10-16 RX ORDER — BUPRENORPHINE AND NALOXONE 8; 2 MG/1; MG/1
3 FILM, SOLUBLE BUCCAL; SUBLINGUAL DAILY
Qty: 45 FILM | Refills: 0 | Status: SHIPPED | OUTPATIENT
Start: 2024-10-16 | End: 2024-10-30

## 2024-10-16 RX ORDER — BUPRENORPHINE AND NALOXONE 4; 1 MG/1; MG/1
1 FILM, SOLUBLE BUCCAL; SUBLINGUAL 2 TIMES DAILY
Qty: 30 FILM | Refills: 0 | Status: SHIPPED | OUTPATIENT
Start: 2024-10-16 | End: 2024-10-18

## 2024-10-16 ASSESSMENT — PATIENT HEALTH QUESTIONNAIRE - PHQ9: SUM OF ALL RESPONSES TO PHQ QUESTIONS 1-9: 8

## 2024-10-16 NOTE — PROGRESS NOTES
[facility] Follow Up    ASSESSMENT/PLAN                                                    1. Opioid use disorder, severe, dependence (H)  Able to resume buprenorphine after last visit, incomplete control of symptoms.   Increase buprenorphine to 32mg/day  Additional naloxone prescribed.   Pt declined need for Miralax  Continue [facility]  - naloxone (NARCAN) 4 MG/0.1ML nasal spray; Spray 1 spray (4 mg) into one nostril alternating nostrils as needed for opioid reversal. every 2-3 minutes until assistance arrives  Dispense: 0.2 mL; Refill: 11  - buprenorphine HCl-naloxone HCl (SUBOXONE) 8-2 MG per film; Place 3 Film under the tongue daily.  Dispense: 45 Film; Refill: 0  - buprenorphine HCl-naloxone HCl (SUBOXONE) 4-1 MG per film; Place 1 Film under the tongue 2 times daily.  Dispense: 30 Film; Refill: 0  - Drug Confirmation Panel Urine with Creat - lab collect; Future  - Drug Confirmation Panel Urine with Creat - lab collect    2. Encounter for monitoring opioid maintenance therapy  - Drugs of Abuse Screen Urine (POC CUPS) POCT  - Drug Confirmation Panel Urine with Creat - lab collect; Future  - Drug Confirmation Panel Urine with Creat - lab collect    3. Opioid withdrawal (H)  Ok to continue prn clonidine as he completes transition from fentanyl to buprenorphine  - cloNIDine (CATAPRES) 0.1 MG tablet; Take 1 tablet (0.1 mg) by mouth 2 times daily as needed (opioid withdrawal).  Dispense: 30 tablet; Refill: 0    4. Alcohol use disorder  Increase gabapentin to 600mg tid  Continue [facility]  - gabapentin (NEURONTIN) 600 MG tablet; Take 1 tablet (600 mg) by mouth 3 times daily.  Dispense: 45 tablet; Refill: 0    5. Stimulant use disorder  Continue programming with [facility].     6. Tobacco use disorder  Reports he is decreasing use, declined NRT      Return in about 2 weeks (around 10/30/2024) for Follow up, in person.      Patient counseling completed today:  Discussed mechanism of action, potential  risks/benefits/side effects of medications and other recommendations above.    Harm reduction counseling including never use alone, availability of naloxone, risks associated with concurrent use of opioids and benzodiazepines, alcohol, or other sedatives, safer administration as applicable.  Discussed importance of avoiding isolation, building a network of supportive relationships, avoiding people/places/things associated with past use to reduce risk of relapse; including motivational interviewing regarding psychosocial interventions.   SUBJECTIVE                                                          CC/HPI:  Rm Merchant is a 49 year old male with PMH hepatitis C with evidence of spontaneous resolution, MDD, PTSD, psychosis, polysubstance use disorder including opioids on buprenorphine who presents to the Recovery Clinic for return visit.       Brief History:  Rm Merchant was first seen in Recovery Clinic on 08/10/23. They were referred by staff at [facility] due to difficulties filling his most recent rx for buprenorphine through [facility]. - Lost to follow up after 11/23/23.  - RTC 3/8/24 following ~1 month of return to use of fentanyl.  Resumed buprenorphine 24mg/day.  In programming at [facility].  Lost to follow-up after 4/16/24.  - RTC 10/10/24.  S/p 6 month return to use of alcohol, fentanyl, methamphetamine, cannabis.  Rx to resume buprenorphine.  Started programming w/ [facility].       10/16/24 HPI:  Pt reports he resumed buprenorphine without a problem and has been taking 24mg daily.  His withdrawal symptoms are slowly improving, but still present.  He states he was given some pills by a person with pt on public transportation and was told by this person they would help with his withdrawals.  Pt last took one of those pills this morning. Pt was disappointed to see his UDS +oxycodone today.  States he threw away the remainder of those pills when he saw his UDS  results.   He has continued to take gabapentin and clonidine to address withdrawal symptoms as well.   He continues to endorse cravings for fentanyl.    Also experiencing cravings for alcohol, states gabapentin is somewhat helpful in addressing this.    Still in residential programming with [facility].        Substance Use History :  Opioids:   Age at first use: 26 years heroin, use increased to daily while incarcerated x21 years.  Released Spring 2023.   Current use: substance: fentanyl; quantity alot; route: insufflation ; timing of last use: 10/6/24                IV drug use: No   History of overdose: No  Previous residential or outpatient treatments for addiction : Yes: [address] 6/7/23 completed residential then started OP  Previous medication treatments for addiction: Yes: suboxone  Longest period of sobriety: 2012 to present  Medical complications related to substance use: No  Hepatitis C: 6/20/24 HCV RNA not detected  HIV: 6/20/24 HIV ag/ab nonreactive     Other Addiction History:  Stimulants   Cocaine 2010 last use  Sedatives/hypnotics/anxiolytics:   no  Alcohol:   Past including during incarceration  Nicotine:   Yes, cigarettes 1/4 ppd and some vaping  Cannabis:   past  Hallucinogens/Dissociatives:   no  Eating disorder:  no  Gambling:              Yes, past        PAST PSYCHIATRIC HISTORY:  Diagnoses- PTSD, depression,anxiety  Suicide Attempts: No   Hospitalizations: No         4/16/2024     3:00 PM 10/10/2024     9:00 AM 10/16/2024     9:00 AM   PHQ   PHQ-9 Total Score 8 24 8   Q9: Thoughts of better off dead/self-harm past 2 weeks Not at all Not at all Not at all       Social History  Housing status: residential at [facility]  Relationship status: Single  Children: no children  Legal: parole 2025      Labs discussed with patient?  Yes      [place] Prescription Drug Monitoring Program Reviewed:  Yes - last filled Suboxone 8-2mg on 6/27/24 (#21, 7 day supply)    Medications:  Current Outpatient Medications    Medication Sig Dispense Refill    buprenorphine HCl-naloxone HCl (SUBOXONE) 4-1 MG per film Place 1 Film under the tongue 2 times daily. 30 Film 0    buprenorphine HCl-naloxone HCl (SUBOXONE) 8-2 MG per film Place 3 Film under the tongue daily. 45 Film 0    cloNIDine (CATAPRES) 0.1 MG tablet Take 1 tablet (0.1 mg) by mouth 2 times daily as needed (opioid withdrawal). 30 tablet 0    gabapentin (NEURONTIN) 600 MG tablet Take 1 tablet (600 mg) by mouth 3 times daily. 45 tablet 0    naloxone (NARCAN) 4 MG/0.1ML nasal spray Spray 1 spray (4 mg) into one nostril alternating nostrils as needed for opioid reversal. every 2-3 minutes until assistance arrives 0.2 mL 11    mupirocin (BACTROBAN) 2 % external ointment Apply topically 3 times daily. 15 g 0     No current facility-administered medications for this visit.       No Known Allergies    PMH, PSH, FamHx reviewed      OBJECTIVE                                                      /87   Pulse 87     Physical Exam  Vitals reviewed.   Constitutional:       General: He is not in acute distress.  Eyes:      General: No scleral icterus.     Extraocular Movements: Extraocular movements intact.      Conjunctiva/sclera: Conjunctivae normal.   Pulmonary:      Effort: Pulmonary effort is normal.   Neurological:      General: No focal deficit present.      Mental Status: He is alert and oriented to person, place, and time.      Gait: Gait normal.   Psychiatric:         Attention and Perception: Attention normal.         Mood and Affect: Mood and affect normal.         Speech: Speech normal.         Behavior: Behavior normal. Behavior is cooperative.         Thought Content: Thought content normal. Thought content does not include suicidal ideation.      Comments: Insight and judgement appropriate         Labs:    UDS:    Lab Results   Component Value Date    BUP Screen Positive (A) 10/16/2024    BZO Negative 10/16/2024    BAR Negative 10/16/2024    CLIFFORD Negative 10/16/2024     MAMP Negative 10/16/2024    AMP Negative 10/16/2024    MDMA Negative 10/16/2024    MTD Negative 10/16/2024    RFF807 Negative 10/16/2024    OXY Screen Positive (A) 10/16/2024    PCP Negative 10/16/2024    THC Negative 10/16/2024    TEMP Invalid (A) 10/16/2024    SGPOCT 1.025 10/16/2024     *POC urine drug screen does not screen for Fentanyl      Recent Results (from the past 240 hour(s))   Drugs of Abuse Screen Urine (POC CUPS) POCT    Collection Time: 10/10/24  9:47 AM   Result Value Ref Range    POCT Kit Lot Number      POCT Kit Expiration Date 2026-05-15     Temperature Urine POCT 90 F 90 F, 92 F, 94 F, 96 F, 98 F, 100 F    Specific [place] POCT 1.015 1.005, 1.015, 1.025    pH Qual Urine POCT 7 pH 4 pH, 5 pH, 7 pH, 9 pH    Creatinine Qual Urine POCT 50 mg/dL 20 mg/dL, 50 mg/dL, 100 mg/dL, 200 mg/dL    Internal QC Qual Urine POCT Valid Valid    Amphetamine Qual Urine POCT Negative Negative    Barbiturate Qual Urine POCT Negative Negative    Buprenorphine Qual Urine POCT Screen Positive (A) Negative    Benzodiazepine Qual Urine POCT Negative Negative    Cocaine Qual Urine POCT Negative Negative    Methamphetamine Qual Urine POCT Negative Negative    MDMA Qual Urine POCT Negative Negative    Methadone Qual Urine POCT Negative Negative    Opiate Qual Urine POCT Negative Negative    Oxycodone Qual Urine POCT Negative Negative    Phencyclidine Qual Urine POCT Negative Negative    THC Qual Urine POCT Screen Positive (A) Negative   Urine Drug Confirmation Panel    Collection Time: 10/10/24 11:05 AM   Result Value Ref Range    EDDP (Methadone Metabolite) ng/mL 781 (H) <50 ng/mL    EDDP (Methadone Metabolite) 468 Absent ng/mg [creat]    Methadone ng/mL 378 (H) <50 ng/mL    Methadone 226 Absent ng/mg [creat]    Norfentanyl ng/mL 5 (H) <5 ng/mL    Norfentanyl 3 Absent ng/mg [creat]    Amphetamine ng/mL 198 (H) <50 ng/mL    Amphetamine 119 Absent ng/mg [creat]    Methamphetamine ng/mL 177 (H) <50 ng/mL     Methamphetamine 106 Absent ng/mg [creat]    Buprenorphine ng/mL 9 (H) <5 ng/mL    Buprenorphine 5 Absent ng/mg [creat]    Norbuprenorphine ng/mL 95 (H) <5 ng/mL    Norbuprenorphine 57 Absent ng/mg [creat]   Urine Creatinine for Drug Screen Panel    Collection Time: 10/10/24 11:05 AM   Result Value Ref Range    Creatinine Urine for Drug Screen 167 mg/dL   Drugs of Abuse Screen Urine (POC CUPS) POCT    Collection Time: 10/16/24  9:20 AM   Result Value Ref Range    POCT Kit Lot Number [id number]     POCT Kit Expiration Date 6379300     Temperature Urine POCT Invalid (A) 90 F, 92 F, 94 F, 96 F, 98 F, 100 F    Specific [place] POCT 1.025 1.005, 1.015, 1.025    pH Qual Urine POCT 5 pH 4 pH, 5 pH, 7 pH, 9 pH    Creatinine Qual Urine POCT 50 mg/dL 20 mg/dL, 50 mg/dL, 100 mg/dL, 200 mg/dL    Internal QC Qual Urine POCT Valid Valid    Amphetamine Qual Urine POCT Negative Negative    Barbiturate Qual Urine POCT Negative Negative    Buprenorphine Qual Urine POCT Screen Positive (A) Negative    Benzodiazepine Qual Urine POCT Negative Negative    Cocaine Qual Urine POCT Negative Negative    Methamphetamine Qual Urine POCT Negative Negative    MDMA Qual Urine POCT Negative Negative    Methadone Qual Urine POCT Negative Negative    Opiate Qual Urine POCT Negative Negative    Oxycodone Qual Urine POCT Screen Positive (A) Negative    Phencyclidine Qual Urine POCT Negative Negative    THC Qual Urine POCT Negative Negative           The longitudinal plan of care for the diagnosis(es)/condition(s) as documented were addressed during this visit. Due to the added complexity in care, I will continue to support Edgar in the subsequent management and with ongoing continuity of care.      Kassie Mckinley MD  Addiction Medicine  [facility]  [address] , [address] [address] [phone]

## 2024-10-16 NOTE — TELEPHONE ENCOUNTER
PA needed on suboxone 4-1 mg film  Pt insurance is [facility]  Insurance phone number [phone]  Pt ID number [id number]  Please let us know when PA is granted or denied.    Thank you    Kendy Silverman  [facility]   [phone]

## 2024-10-16 NOTE — NURSING NOTE
[facility]      Rooming information:  Pt stated someone gave him a pill for the withdrawal- would dlike to discuss has concerns due to POC results  Approximate last use of full opioid agonist: 10/6/24  Taking buprenorphine? Yes: suboxone  How much per day? 3  Number of buprenorphine films/tablets remaining currently: 0  Side effects related to buprenorphine (constipation, dry mouth, sedation?) No   Narcan currently available: Yes  Other recent substance use:    Denies- but later stated got a pill from someone  NICOTINE-Yes: cigs occassionally  If using nicotine, ready to quit? Yes: trying    Point of care urine drug screen positive for:  Lab Results   Component Value Date    BUP Screen Positive (A) 10/16/2024    BZO Negative 10/16/2024    BAR Negative 10/16/2024    CLIFFORD Negative 10/16/2024    MAMP Negative 10/16/2024    AMP Negative 10/16/2024    MDMA Negative 10/16/2024    MTD Negative 10/16/2024    [id number] Negative 10/16/2024    OXY Screen Positive (A) 10/16/2024    PCP Negative 10/16/2024    THC Negative 10/16/2024    TEMP Invalid (A) 10/16/2024    SGPOCT 1.025 10/16/2024       *POC urine drug screen does not screen for Fentanyl        Depression Response    Patient completed the PHQ-9 assessment for depression and scored >9? No  Question 9 on the PHQ-9 was positive for suicidality? No  Does patient have current mental health provider? No  C-SSRS screener risk level.       Is this a virtual visit? No    I personally notified the following: NA          10/16/2024     9:00 AM   PHQ Assesment Total Score(s)   PHQ-9 Score 8         Housing needs: [facility]- [facility]    Insurance: active    Current legal issues: none    Contact information up to date? yes    [employer] [facility] (please obtain RAMIRO if pt would like to include)    Orville Kent MA  October 16, 2024  9:11 AM

## 2024-10-18 ENCOUNTER — TELEPHONE (OUTPATIENT)
Dept: BEHAVIORAL HEALTH | Facility: CLINIC | Age: 49
End: 2024-10-18
Payer: COMMERCIAL

## 2024-10-18 DIAGNOSIS — F11.20 OPIOID USE DISORDER, SEVERE, DEPENDENCE (H): Primary | ICD-10-CM

## 2024-10-18 RX ORDER — BUPRENORPHINE HYDROCHLORIDE AND NALOXONE HYDROCHLORIDE DIHYDRATE 8; 2 MG/1; MG/1
0.5 TABLET SUBLINGUAL 2 TIMES DAILY
Qty: 15 TABLET | Refills: 0 | Status: SHIPPED | OUTPATIENT
Start: 2024-10-18 | End: 2024-10-21

## 2024-10-18 NOTE — TELEPHONE ENCOUNTER
RN attempted to call patient back to discuss medication. Call went to patients personal VM. RN left a message to call the clinic back or send a MyC message with concerns.    Per 10/16/24 visit, patient was increased to 32 mg per day.     RN called [place] [facility] pharmacy. They state the 8 mg film script was picked up on 10/16/24:  buprenorphine HCl-naloxone HCl (SUBOXONE) 8-2 MG per film 45 Film 0 10/16/2024 -- No   Sig - Route: Place 3 Film under the tongue daily. - Sublingual     They state the script for 4 mg films is a quantity limit as plan only covers 1/2 film per day.     Per chart review, a PA was started. RN messaged assigned PA team member to request high priority status.     Routing to provider for additional review.     Also awaiting return call from patient and will call and update patient with any new directives.     Verna Doyle RN on 10/18/2024 at 3:16 PM

## 2024-10-18 NOTE — TELEPHONE ENCOUNTER
Can we see if his insurance will cover:   1) 4x 8mg/2mg films daily or  2) additional 8mg/2mg tablet plus 3x8mg/2mg films daily

## 2024-10-18 NOTE — TELEPHONE ENCOUNTER
Reason for call:  Other   Patient called regarding (reason for call): call back  Additional comments: pt is requesting a call back from nursing staff in regards too medication that was  prescribed . Please call pt back     Phone number to reach patient:  Home number on file [phone] (home)    Best Time:  any     Can we leave a detailed message on this number?  YES    Travel screening: Negative

## 2024-10-18 NOTE — TELEPHONE ENCOUNTER
RN called pharmacy back and they state (4) 8 mg films will likely not go through but the 8 mg tablets did go through (option 2).    Routing to provider - pharmacy changed script to 8 mg tablets. Take 1/2 tab BID. Quantity #15.     Routing update to provider.     Verna Doyle RN on 10/18/2024 at 3:40 PM

## 2024-10-18 NOTE — TELEPHONE ENCOUNTER
Central Prior Authorization Team - Phone: [phone]     PA Initiation    Medication: SUBOXONE 4-1 MG SL FILM  Insurance Company: [employer] - Phone [phone] Fax [phone]  Pharmacy Filling the Rx: [place], [place] - [address]  Filling Pharmacy Phone: [phone]  Filling Pharmacy Fax:    Start Date: 10/18/2024

## 2024-10-21 ENCOUNTER — TELEPHONE (OUTPATIENT)
Dept: BEHAVIORAL HEALTH | Facility: CLINIC | Age: 49
End: 2024-10-21
Payer: COMMERCIAL

## 2024-10-21 DIAGNOSIS — F11.20 OPIOID USE DISORDER, SEVERE, DEPENDENCE (H): ICD-10-CM

## 2024-10-21 RX ORDER — BUPRENORPHINE HYDROCHLORIDE AND NALOXONE HYDROCHLORIDE DIHYDRATE 8; 2 MG/1; MG/1
0.5 TABLET SUBLINGUAL 2 TIMES DAILY
Qty: 15 TABLET | Refills: 0 | Status: SHIPPED | OUTPATIENT
Start: 2024-10-21 | End: 2024-10-30

## 2024-10-21 NOTE — TELEPHONE ENCOUNTER
Central Prior Authorization Team - Phone: [phone]     PRIOR AUTHORIZATION DENIED    Medication: SUBOXONE 4-1 MG SL FILM  Insurance Company: [employer] - Phone [phone] Fax [phone]  Denial Date: 10/18/2024    Denial Reason(s):   EXCEEDS QUANTITY LIMIT OF ONE-HALF FILM PER DAY. Preferred prescribing is to use 8-2 mg films one-half film twice daily    Appeal Information:       Patient Notified: NO  Unfortunately, we cannot call the patient with denials because we do not know what next steps the MD will take nor can we give medical advice, please notify the patient of what they are to expect for the continuation of their therapy from the provider.

## 2024-10-21 NOTE — TELEPHONE ENCOUNTER
PA denial already addressed with rx for buprenorphine-naloxone 8-2mg tablets, 1/2 pill, twice daily.     Betzy Krishna RN on 10/21/2024 at 2:23 PM

## 2024-10-21 NOTE — TELEPHONE ENCOUNTER
New script sent to [facility].    1. Opioid use disorder, severe, dependence (H)  - buprenorphine-naloxone (SUBOXONE) 8-2 MG SUBL sublingual tablet; Place 0.5 tablets under the tongue 2 times daily.  Dispense: 15 tablet; Refill: 0    Bonita Rizzo,  on 10/21/2024 at 2:25 PM

## 2024-10-21 NOTE — TELEPHONE ENCOUNTER
Zoey nurse at [facility] at [phone]   Fax info [phone]     Phone call to nurse Zoey at [facility] ([facility] Zoey reports patient did not  rx for Suboxone tablets at [facility]. Nurse requesting Suboxone tablets rx be resent to [facility] which will deliver to [facility].    buprenorphine-naloxone (SUBOXONE) 8-2 MG SUBL sublingual tablet 15 tablet 0 10/18/2024 -- No   Sig - Route: Place 0.5 tablets under the tongue 2 times daily. - Sublingual     Verified with [facility] that Suboxone tablets were not picked up by patient. Cancelled rx.    Dr. Mckinley out of office. Routing to Dr. Rizzo to resend Suboxone 8-2mg tablet rx to [facility].    Betzy Krishna RN on 10/21/2024 at 2:11 PM

## 2024-10-21 NOTE — TELEPHONE ENCOUNTER
Please reach lobo henao at [facility] at [phone]   Fax info [phone]

## 2024-10-21 NOTE — TELEPHONE ENCOUNTER
RAMIRO for [place] on file; per chart, patient is at [facility] location. Attempted to call patient back. No answer; LVM to call clinic. Attempted to reach nurse at [facility], no answer. LVM to call clinic.     Betzy Krishna RN on 10/21/2024 at 1:31 PM

## 2024-10-21 NOTE — TELEPHONE ENCOUNTER
Duplicate encounter. See telephone encounter from earlier today.    Betzy Krishna RN on 10/21/2024 at 12:51 PM

## 2024-10-21 NOTE — TELEPHONE ENCOUNTER
Patient called  ek[facility] and requested to speak with recovery clinic. Patient stated multiple attempts to call. [phone] please call at this phone number as provided by patient.    Manjula Schaeffer  10/21/2024  [id number]

## 2024-10-21 NOTE — TELEPHONE ENCOUNTER
Reason for call:  Other   Patient called regarding (reason for call): prescription  Additional comments: pt called stating that his sbxn was increased from provider here at recovery clinic , but staff at treatment wont let him start increase dose without required documents stating does ws increased - please shakir pt back at number on file     Phone number to reach patient:  Home number on file [phone] (home)    Best Time:  any    Can we leave a detailed message on this number?  YES    Travel screening: Negative

## 2024-10-22 ENCOUNTER — APPOINTMENT (OUTPATIENT)
Dept: RADIOLOGY | Facility: HOSPITAL | Age: 49
End: 2024-10-22
Payer: COMMERCIAL

## 2024-10-22 ENCOUNTER — HOSPITAL ENCOUNTER (EMERGENCY)
Facility: HOSPITAL | Age: 49
Discharge: HOME | End: 2024-10-22
Payer: COMMERCIAL

## 2024-10-22 VITALS
TEMPERATURE: 98.1 F | BODY MASS INDEX: 24.06 KG/M2 | HEIGHT: 71 IN | DIASTOLIC BLOOD PRESSURE: 97 MMHG | WEIGHT: 171.9 LBS | HEART RATE: 60 BPM | RESPIRATION RATE: 20 BRPM | SYSTOLIC BLOOD PRESSURE: 139 MMHG | OXYGEN SATURATION: 100 %

## 2024-10-22 DIAGNOSIS — M93.90 APOPHYSITIS: ICD-10-CM

## 2024-10-22 DIAGNOSIS — M92.522 OSGOOD-SCHLATTER'S DISEASE, LEFT: ICD-10-CM

## 2024-10-22 LAB
BUPRENORPHINE UR CFM-MCNC: 1171 NG/ML
BUPRENORPHINE/CREAT UR: 586 NG/MG {CREAT}
EDDP CTO UR SCN-MCNC: 50 NG/ML
EDDP/CREAT UR: 25 NG/MG {CREAT}
GABAPENTIN UR QL CFM: PRESENT
METHADONE UR CFM-MCNC: 139 NG/ML
METHADONE/CREAT UR: 70 NG/MG {CREAT}
NALOXONE UR CFM-MCNC: 6340 NG/ML
NALOXONE: 3170 NG/MG {CREAT}
NORBUPRENORPHINE UR CFM-MCNC: 3480 NG/ML
NORBUPRENORPHINE/CREAT UR: 1740 NG/MG {CREAT}

## 2024-10-22 PROCEDURE — 250N000013 HC RX MED GY IP 250 OP 250 PS 637

## 2024-10-22 PROCEDURE — 99283 EMERGENCY DEPT VISIT LOW MDM: CPT

## 2024-10-22 PROCEDURE — 73562 X-RAY EXAM OF KNEE 3: CPT | Mod: LT

## 2024-10-22 RX ORDER — IBUPROFEN 600 MG/1
600 TABLET, FILM COATED ORAL ONCE
Status: COMPLETED | OUTPATIENT
Start: 2024-10-22 | End: 2024-10-22

## 2024-10-22 RX ADMIN — IBUPROFEN 600 MG: 600 TABLET, FILM COATED ORAL at 19:54

## 2024-10-22 ASSESSMENT — ACTIVITIES OF DAILY LIVING (ADL)
ADLS_ACUITY_SCORE: 35
ADLS_ACUITY_SCORE: 35

## 2024-10-22 ASSESSMENT — COLUMBIA-SUICIDE SEVERITY RATING SCALE - C-SSRS
2. HAVE YOU ACTUALLY HAD ANY THOUGHTS OF KILLING YOURSELF IN THE PAST MONTH?: YES
5. HAVE YOU STARTED TO WORK OUT OR WORKED OUT THE DETAILS OF HOW TO KILL YOURSELF? DO YOU INTEND TO CARRY OUT THIS PLAN?: NO
3. HAVE YOU BEEN THINKING ABOUT HOW YOU MIGHT KILL YOURSELF?: NO
1. IN THE PAST MONTH, HAVE YOU WISHED YOU WERE DEAD OR WISHED YOU COULD GO TO SLEEP AND NOT WAKE UP?: YES
4. HAVE YOU HAD THESE THOUGHTS AND HAD SOME INTENTION OF ACTING ON THEM?: NO
6. HAVE YOU EVER DONE ANYTHING, STARTED TO DO ANYTHING, OR PREPARED TO DO ANYTHING TO END YOUR LIFE?: YES

## 2024-10-22 NOTE — ED PROVIDER NOTES
EMERGENCY DEPARTMENT ENCOUNTER      NAME: Edgar Lozano  AGE: 49 year old male  YOB: 1975  MRN: [id number]  EVALUATION DATE & TIME: No admission date for patient encounter.    PCP: Demar Estes [facility]    ED PROVIDER: Karen Leo PA-C      Chief Complaint   Patient presents with    Knee Pain    Headache         FINAL IMPRESSION:  1. Osgood-Schlatter's disease, left    2. Apophysitis          ED COURSE & MEDICAL DECISION MAKIN:15 PM Met with patient for initial interview. Plan for care discussed.  7:32 PM Reevaluated and updated patient. I discussed the plan for discharge with the patient, and patient is agreeable. We discussed supportive cares at home and reasons for return to the ER including new or worsening symptoms. All questions and concerns addressed. Patient to be discharged by RN.    49 year old male with a history of opioid use disorder (on Suboxone), polysubstance abuse (alcohol, Fentanyl, methamphetamines - all in remission) who presents to this ED for evaluation of left knee pain. He reports similar symptoms ~15+ years ago. Upon exam, patient is afebrile, mildly hypertensive, but in no acute distress. Tenderness and edema of tibial tuberosity, with Osgood-Schlatter appearance. No overlying cellulitis, evidence of abscess, or open wound. Full ROM, with reproducible pain at full extension. Neurovascularly intact. 5/5 strength. Compartments soft. No joint effusion. Differential diagnosis includes but not limited to apophysitis/Osgood-Schlatter exacerbation, fracture, bony lesion, dislocation. Appearance and exam not consistent with septic joint or abscess. No tenderness along deep venous system, not consistent with DVT; therefore, US deferred. Patient denies IVDU in that area. He states he last used in his right upper arm about 3-4 weeks ago. Right upper extremity evaluated and showed no signs of cellulitis, abscess, or lymphangitis. Based on  patient's presenting symptoms, imaging was ordered. XR revealed no acute fractures, but did show chronic appearing fragmentation of tibial tuberosity.     Patient was treated with ibuprofen, ice and ACE wrap with moderate improvement in symptoms. Symptoms and workup most consistent with apophysitis in setting of chronic appearing Osgood-Schlatter disease. Patient was made aware of the above findings. Patient passed ambulation trial with cane. Additionally, patient notes a history of chronic suicidal ideation, but is currently in treatment for substance abuse and follows with a psychiatrist who does have him on medications. He has an appointment scheduled with her on Monday. He reports no associated intent or plan. He states he has a good support system at treatment and feels safe returning. He declines speaking with DEC today and given in treatment with resources and psychiatry follow-up without intent or plan and long history of suicidal ideation, feel safe discharging patient home. Plan to discharge patient home with symptomatic management, strict return precautions, and close follow up with their PCP and/or orthopedics and psychiatry for reevaluation and ongoing management - referral placed today. The patient was stable and well appearing upon discharge. The patient was advised to return to the ER if any new or worsening symptoms develop. The patient verbalizes understanding and agrees with the plan.     Medical Decision Making  Obtained supplemental history:Supplemental history obtained?: No  Reviewed external records: External records reviewed?: Documented in chart  Care impacted by chronic illness:Alcohol and/or Drug Abuse or Dependence and Mental Health  Did you consider but not order tests?: Work up considered but not performed and documented in chart, if applicable  Did you interpret images independently?: Independent interpretation of ECG and images noted in documentation, when applicable.  Consultation  discussion with other provider:Did you involve another provider (consultant, , pharmacy, etc.)?: No  Discharge. No recommendations on prescription strength medication(s). See documentation for any additional details.    MEDICATIONS GIVEN IN THE EMERGENCY:  Medications   ibuprofen (ADVIL/MOTRIN) tablet 600 mg (600 mg Oral $Given 10/22/24 1954)       NEW PRESCRIPTIONS STARTED AT TODAY'S ER VISIT  Discharge Medication List as of 10/22/2024  7:49 PM             =================================================================    HPI    Patient information was obtained from: patient    Use of : N/A       Edgar Roberto Karly Lozano is a 49 year old male with a pertinent history of opioid use disorder (on Suboxone), polysubstance abuse (alcohol, Fentanyl, methamphetamines - all in remission) who presents to this ED for evaluation of left knee pain that has been going on for the last week or two. He states he has had swelling and pain in this area (tibial tuberosity) before about 15-20 years ago. He denies preceding injury. He denies fevers, chills, redness, warmth, pus-like drainage, IVDU in this area, recent wounds/cuts/bites, new numbness or weakness. He states pain is exacerbated with ambulation and full extension. He reports sometimes his pain gets so intense he starts getting a headache. He denies any current headache, vision changes, speech difficulty, numbness/weakness extremities, or neck stiffness. Patient denies IVDU in left knee, but does admit to last use in his right upper arm about 3-4 weeks ago. He states he is sober from all substances including alcohol since 10/08/24 and is motivated to continue his sobriety. He is currently in treatment for substance use disorder and doing well. He denies associated skin changes or pain in area of last use (right upper arm) or any other skin or musculoskeletal pains/abnormalities. No rashes.     Additionally, patient notes a history of chronic suicidal  ideation, but is currently in treatment for substance abuse and follows with a psychiatrist who does have him on medications. He has an appointment scheduled with her on Monday. He reports no associated intent or plan. He states he has a good support system at treatment and feels safe returning. He states the staff and other residents/patients are very supportive and list them as protecting factors.     REVIEW OF SYSTEMS   Review of Systems see HPI    PAST MEDICAL HISTORY:  No past medical history on file.    PAST SURGICAL HISTORY:  No past surgical history on file.        CURRENT MEDICATIONS:    buprenorphine HCl-naloxone HCl (SUBOXONE) 8-2 MG per film  buprenorphine-naloxone (SUBOXONE) 8-2 MG SUBL sublingual tablet  cloNIDine (CATAPRES) 0.1 MG tablet  gabapentin (NEURONTIN) 600 MG tablet  mupirocin (BACTROBAN) 2 % external ointment  naloxone (NARCAN) 4 MG/0.1ML nasal spray        ALLERGIES:  No Known Allergies    FAMILY HISTORY:  Family History   Problem Relation Age of Onset    Substance Abuse Mother     Substance Abuse Father        SOCIAL HISTORY:   Social History     Socioeconomic History    Marital status: Single   Tobacco Use    Smoking status: Every Day     Types: Cigarettes    Smokeless tobacco: Never   Vaping Use    Vaping status: Never Used   Substance and Sexual Activity    Alcohol use: Not Currently    Drug use: Not Currently     Types: Fentanyl     Comment: last use 02/19/2024     Social Determinants of Health     Financial Resource Strain: High Risk (5/3/2024)    Received from [facility]    Overall Financial Resource Strain (CARDIA)     Difficulty of Paying Living Expenses: Very hard   Food Insecurity: Food Insecurity Present (7/7/2023)    Received from [facility], [facility]    Food Insecurity     Worried About Running Out of Food in the Last Year: 2   Transportation Needs: Unmet Transportation Needs (5/3/2024)    Received  "from [facility]    PRAPARE - Transportation     Lack of Transportation (Medical): Yes     Lack of Transportation (Non-Medical): Yes   Physical Activity: Inactive (5/3/2024)    Received from [facility]    Exercise Vital Sign     Days of Exercise per Week: 0 days     Minutes of Exercise per Session: 0 min   Stress: Stress Concern Present (5/30/2023)    [facility] from [facility], [facility]    [employer] [place] [employer] - Occupational Stress Questionnaire     Feeling of Stress : Very much    Received from [facility]    Social Connections   Interpersonal Safety: Not At Risk (5/30/2023)    [facility], [facility]    Humiliation, Afraid, Rape, and Kick questionnaire     Fear of Current or Ex-Partner: No     Emotionally Abused: No     Physically Abused: No     Sexually Abused: No   Housing Stability: High Risk (5/3/2024)    Received from [facility]    Housing Stability     What is your housing situation today?: 1 - I do not have housing (I am staying with others, in a hotel, in a shelter, living outside on ...       VITALS:  BP (!) 139/97   Pulse 60   Temp 98.1  F (36.7  C) (Oral)   Resp 20   Ht 1.803 m (5' 11\")   Wt 78 kg (171 lb 14.4 oz)   SpO2 100%   BMI 23.98 kg/m      PHYSICAL EXAM    Constitutional:  Alert, in no acute distress. Cooperative.  EYES: Conjunctivae clear.  HENT:  Atraumatic, normocephalic.  Respiratory:  Respirations even, unlabored, in no acute respiratory distress.  Cardiovascular:  Regular rate, good peripheral perfusion.  GI: Soft, flat, non-distended.  Musculoskeletal: Left lower extremity: tenderness to palpation over tibial tuberosity with associated edema. No overlying erythema, warmth, purulence, fluctuance, ecchymosis, crepitus. No abrasions or obvious wounds. No obvious joint laxity or effusion. Full ROM with reproducible pain with full extension. Neurovascularly " intact distally. Cap refill <2 seconds. 2+ DP and PT pulses bilaterally. 5/5 strength. Compartments soft.  Integument: Warm, Dry. Right upper extremity without redness, warmth, red streaking, purulence, fluctuance, crepitus, edema, ecchymosis, or tenderness to palpation. Full ROM without pain. Neurovascularly intact. Compartments soft.   Neurologic:  Alert & oriented. No focal deficits noted.  Psych: Normal mood and affect. Suicidal ideations (chronic), no plan or intent. No homicidal ideation or plan. No hallucinations.      LAB:  All pertinent labs reviewed and interpreted.  Results for orders placed or performed during the hospital encounter of 10/22/24   XR Knee Left 3 Views    Impression    IMPRESSION: No acute fracture or subluxation. Joint spaces are preserved. No left knee joint effusion. Fragmentation of the tibial tubercle. Quadriceps enthesophyte.       RADIOLOGY:  Reviewed all pertinent imaging. Please see official radiology report.  XR Knee Left 3 Views   Final Result   IMPRESSION: No acute fracture or subluxation. Joint spaces are preserved. No left knee joint effusion. Fragmentation of the tibial tubercle. Quadriceps enthesophyte.        Karen Leo PA-C  [facility] EMERGENCY DEPARTMENT  [address] [address]  [phone]      Karen Leo PA-C  10/23/24 0841

## 2024-10-22 NOTE — ED TRIAGE NOTES
Pt ambulatory to triage c/o left knee pain and headaches for the past week. Pt denies recent trauma or injury. Pt c/o pain below left patella radiating along medial aspect with tenderness to palpation.   Pt comes from [facility] where he has been since 10/8 for tx of meth and fentanyl abuse.   Pt took a tylenol about 5 hrs ago.  Pt does have positive suicide screening, stating he has had suicidal thoughts and did attempt suicide about 2 months ago by overdosing, but that was when he decided to get help and enter treatment and has been sober since then. Pt denies any current suicidal thoughts and states he feels safe in his current treatment facility.

## 2024-10-23 NOTE — DISCHARGE INSTRUCTIONS
You were seen in the ER for left knee pain. Your XR showed fragmentation of your tibial tuberosity as discussed.    Rest, ice/heat, compression with ACE wrap, elevate your extremity, increase activity as tolerates. You may use topical Icy Hot or Lidoderm as needed. You may use ibuprofen for swelling/pain and Tylenol as needed for pain.    Ibuprofen/Naproxen Discharge Instructions:  You may take ibuprofen for pain control.  The maximum dose of (ibuprofen is 3200 mg ) in a 24-hour period.    Take this medication with food to prevent stomach irritation.  With long-term use this medication can irritate the stomach causing pain and lead to development of a stomach ulcer.  If you notice stomach pain or vomiting of coffee-ground colored vomit or blood, please be seen by a healthcare provider.  Attempt to use this medication for the shortest time possible.      Tylenol (Acetaminophen) Discharge Instructions:  You may take 2 tablets of regular strength, over-the-counter, Tylenol (acetaminophen) every 4-6 hours as needed for pain.  Take no more than 4000 mg of Tylenol in a 24-hour period.      Avoid taking more than 1 acetaminophen-containing product at a time and be aware that many over-the-counter medications contain a combination of acetaminophen and other products.  If you are taking Tylenol in addition to a combination product please keep track of your daily acetaminophen dose to make sure you do not exceed the recommended 4000 mg.  Taking too much acetaminophen can cause permanent damage to your liver.    Follow-up with your primary care provider and/or orthopedics for reevaluation and possible additional imaging, joint steroid injection, and/or physical therapy referral.     Return to the emergency department for any new or worsening symptoms including increased pain, redness/warmth/drainage/swelling, fever/chills, new weakness, numbness/tingling, decreased range of motion, cold extremity, or any other concerning  symptoms.     Take Care!  - Karen Leo PA-C

## 2024-10-29 ENCOUNTER — TELEPHONE (OUTPATIENT)
Dept: BEHAVIORAL HEALTH | Facility: CLINIC | Age: 49
End: 2024-10-29
Payer: COMMERCIAL

## 2024-10-29 NOTE — TELEPHONE ENCOUNTER
Patient is currently in Treatment facility and cannot make his appt for tomorrow because he does not have any more passes to leave the facility. He is wondering if he can do virtual or get a bridge. He is out of medications today. Suboxone, Gabapentin, and Patient is currently in Treatment facility and cannot make his appt for tomorrow because he does not have any more passes to leave the facility. He is wondering if he can do virtual or get a bridge. He is out of medications today. Suboxone, Gabapentin, and Clonidine. Please advise and I will call patient back.

## 2024-10-29 NOTE — TELEPHONE ENCOUNTER
Attempted to call patient. No answer; LVM. Per chart, patient at [facility]. RAMIRO on file.     Phone call to nurse at [facility], [phone] or [phone]. Per nurse, patient already has a med ride arranged for [facility] morning at 9am, and patient has enough medications through to appt. No refills needed at this time.    Routing to Dr. Mckinley as KELSEY.    Betzy Krishna RN on 10/29/2024 at 2:51 PM

## 2024-10-29 NOTE — PROGRESS NOTES
[facility] Follow Up    ASSESSMENT/PLAN                                                    1. Opioid use disorder, severe, dependence (H) (Primary)  UDS +oxycodone today, pt denies use of full opioid agonist since 10/6/24.  Confirmatory testing pending.   Pt reporting some improvement in cravings with recent increase of buprenorphine.  Expresses interest in Sublocade.    We reviewed Sublocade is most effective in situations where sublingual buprenorphine 8-24mg/day are working to control symptoms.  I would not recommend transfer to Sublocade if pt is requiring 32mg/day.  Pt initially stated he wanted to decrease dose of SL buprenorphine, but with further discussion regarding cravings and early recovery he agreed to continue SL buprenorphine unchanged at this time.   Continue buprenorphine 32mg TDD.   Consider reduction of dose to 24mg/day in the future if pt continues to want to move in the direction of Sublocade.   Continue programming with [facility].   - Drugs of Abuse Screen Urine (POC CUPS) POCT  - Drug Confirmation Panel Urine with Creat - lab collect; Future  - buprenorphine-naloxone (SUBOXONE) 8-2 MG SUBL sublingual tablet; Place 0.5 tablets under the tongue 2 times daily.  Dispense: 15 tablet; Refill: 0  - buprenorphine HCl-naloxone HCl (SUBOXONE) 8-2 MG per film; Place 3 Film under the tongue daily.  Dispense: 45 Film; Refill: 0    2. Encounter for monitoring opioid maintenance therapy  - Drugs of Abuse Screen Urine (POC CUPS) POCT  - Drug Confirmation Panel Urine with Creat - lab collect; Future    3. Alcohol use disorder  Ok to increase gabapentin to 800mg tid to address AUD and anxiety.   Continue programming with [facility]  - gabapentin (NEURONTIN) 800 MG tablet; Take 1 tablet (800 mg) by mouth 3 times daily.  Dispense: 45 tablet; Refill: 0    4. Stimulant use disorder  Denies cravings for methamphetamine.   Continue programming with [facility].     5. Tobacco use disorder  Occasional  cigarette use.  Working on quitting.  Discuss NRT at follow-up visit.     6. Anxiety  Ok to continue clonidine 0.1mg bid prn anxiety.  Do not take with prazosin.  Discussed transferring treatment of anxiety to PCP or psych prescriber as his care with [facility] continues to develop.  Pt expressed understanding.   - cloNIDine (CATAPRES) 0.1 MG tablet; Take 1 tablet (0.1 mg) by mouth 2 times daily as needed (opioid withdrawal).  Dispense: 30 tablet; Refill: 0    7. Mood disorder (H)  8. Psychosis, unspecified psychosis type (H)  Prescribed sertraline, risperidone, prazosin through [facility].  Has established with  clinic/therapist.  Appt with PCP  next week who was previously prescribing psychotropics.  Follow-up with [facility] providers as planned.  Discussed possibility of centralizing all of his care through [facility].  Pt wants to continue addiction treatment through  at this time.       Return in about 2 weeks (around 11/13/2024).      Patient counseling completed today:  Discussed mechanism of action, potential risks/benefits/side effects of medications and other recommendations above.    Harm reduction counseling including never use alone, availability of naloxone, risks associated with concurrent use of opioids and benzodiazepines, alcohol, or other sedatives, safer administration as applicable.  Discussed importance of avoiding isolation, building a network of supportive relationships, avoiding people/places/things associated with past use to reduce risk of relapse; including motivational interviewing regarding psychosocial interventions.   SUBJECTIVE                                                          CC/HPI:  Rm Merchant is a 49 year old male with PMH hepatitis C with evidence of spontaneous resolution, MDD, PTSD, psychosis unspecified vs bipolar vs schizoaffective d/o, polysubstance use disorder including opioids on buprenorphine who presents to the Recovery Clinic for return visit.       Brief  "History:  Rm Merchant was first seen in Recovery Clinic on 08/10/23. They were referred by staff at [facility] due to difficulties filling his most recent rx for buprenorphine through [facility]. - Lost to follow up after 11/23/23.  - RTC 3/8/24 following ~1 month of return to use of fentanyl.  Resumed buprenorphine 24mg/day.  In programming at [facility].  Lost to follow-up after 4/16/24.  - RTC 10/10/24.  S/p 6 month return to use of alcohol, fentanyl, methamphetamine, cannabis.  Rx to resume buprenorphine.  Started programming w/ [facility].   - 10/16/24 buprenorphine increased to 32mg/day      10/30/24 HPI:  Pt states he has noticed improvement in cravings with increase of buprenorphine to 32mg/day.  No c/o side effects related to buprenorphine.   He also noticed improvement in his anxiety with increase of gabapentin last visit.  He continues with high levels of anxiety and asks about increasing gabapentin today.   He has re-established with therapist through [facility] and PCP at [facility].  He has an appt with PCP next week.  He states today he would like to continue working with  for treatment of addiction issues.  His PCP had been prescribing sertraline, resperidone, prazosin in the past.  Pt recently restarted those meds through [facility].  He does not yet have a psych prescriber through [facility].   Pt states his depression has been improving.  He does continue to hear \"whispers\" throughout the day.  He cannot make out exactly what the whispers are saying.    He continues in residential treatment with [facility].       Substance Use History :  Opioids:   Age at first use: 26 years heroin, use increased to daily while incarcerated x21 years.  Released Spring 2023.   Current use: substance: fentanyl; quantity alot; route: insufflation ; timing of last use: 10/6/24                IV drug use: No   History of overdose: No  Previous residential or outpatient treatments for addiction : Yes: [facility] 6/7/23 completed residential then started OP  Previous medication treatments for addiction: Yes: suboxone  Longest period of sobriety: 2012 to present  Medical complications related to substance use: No  Hepatitis C: 6/20/24 HCV RNA not detected  HIV: 6/20/24 HIV ag/ab nonreactive     Other Addiction History:  Stimulants   Cocaine 2010 last use  Sedatives/hypnotics/anxiolytics:   no  Alcohol:   Past including during incarceration  Nicotine:   Yes, cigarettes 1/4 ppd and some vaping  Cannabis:   past  Hallucinogens/Dissociatives:   no  Eating disorder:  no  Gambling:              Yes, past        PAST PSYCHIATRIC HISTORY:  Diagnoses- PTSD, depression,anxiety  Suicide Attempts: No   Hospitalizations: No   Had initial appt with [facility] 10/28/24.   [facility] visit 10/23/24 to restart sertraline, resperidone, and prazosin.    Int Med at [facility] reconnecting with pt for ongoing med management.          10/10/2024     9:00 AM 10/16/2024     9:00 AM 10/30/2024     9:00 AM   PHQ   PHQ-9 Total Score 24 8 0   Q9: Thoughts of better off dead/self-harm past 2 weeks Not at all Not at all Not at all       Social History  Housing status: residential at [facility]  Relationship status: Single  Children: no children  Legal: parole 2025        [place] Prescription Drug Monitoring Program Reviewed:  Yes -   10/21/2024 10/21/2024 5 Buprenorphine-Nalox 8-2 Mg Tab 15.00 15 Ka Par [id number] Set (9815) 0/0 8.00 mg Other [place]   10/16/2024 10/16/2024 6 Suboxone 8 Mg-2 Mg Sl Film 45.00 15 Li Vol [id number] Marc (1359) 0/0 24.00 mg Medicaid [place]   10/16/2024 10/16/2024 6 Gabapentin 600 Mg Tablet 45.00 15 Li Vol 7            Medications:  Current Outpatient Medications   Medication Sig Dispense Refill    buprenorphine HCl-naloxone HCl (SUBOXONE) 8-2 MG per film Place 3 Film under the tongue daily. 45 Film 0    buprenorphine-naloxone (SUBOXONE) 8-2 MG SUBL sublingual tablet Place 0.5 tablets under the tongue 2 times daily. 15  tablet 0    cloNIDine (CATAPRES) 0.1 MG tablet Take 1 tablet (0.1 mg) by mouth 2 times daily as needed (opioid withdrawal). 30 tablet 0    gabapentin (NEURONTIN) 600 MG tablet Take 1 tablet (600 mg) by mouth 3 times daily. 45 tablet 0    mupirocin (BACTROBAN) 2 % external ointment Apply topically 3 times daily. 15 g 0    naloxone (NARCAN) 4 MG/0.1ML nasal spray Spray 1 spray (4 mg) into one nostril alternating nostrils as needed for opioid reversal. every 2-3 minutes until assistance arrives 0.2 mL 11     No current facility-administered medications for this visit.       No Known Allergies    PMH, PSH, FamHx reviewed      OBJECTIVE                                                      /83   Pulse 78     Physical Exam  Vitals reviewed.   Constitutional:       General: He is not in acute distress.  Eyes:      General: No scleral icterus.     Extraocular Movements: Extraocular movements intact.      Conjunctiva/sclera: Conjunctivae normal.   Pulmonary:      Effort: Pulmonary effort is normal.   Neurological:      General: No focal deficit present.      Mental Status: He is alert and oriented to person, place, and time.      Gait: Gait normal.   Psychiatric:         Attention and Perception: Attention normal. He perceives auditory hallucinations.         Mood and Affect: Mood normal. Affect is not inappropriate.         Speech: Speech normal.         Behavior: Behavior normal. Behavior is cooperative.         Thought Content: Thought content normal. Thought content does not include suicidal ideation.      Comments: Insight and judgement fair to good         Labs:    UDS:    Lab Results   Component Value Date    BUP Screen Positive (A) 10/16/2024    BZO Negative 10/16/2024    BAR Negative 10/16/2024    CLIFFORD Negative 10/16/2024    MAMP Negative 10/16/2024    AMP Negative 10/16/2024    MDMA Negative 10/16/2024    MTD Negative 10/16/2024    XWL211 Negative 10/16/2024    OXY Screen Positive (A) 10/16/2024    PCP  Negative 10/16/2024    THC Negative 10/16/2024    TEMP Invalid (A) 10/16/2024    SGPOCT 1.025 10/16/2024     *POC urine drug screen does not screen for Fentanyl      Recent Results (from the past 240 hours)   Drugs of Abuse Screen Urine (POC CUPS) POCT    Collection Time: 10/30/24  9:57 AM   Result Value Ref Range    POCT Kit Lot Number [id number]     POCT Kit Expiration Date 3468082     Temperature Urine POCT 90 F 90 F, 92 F, 94 F, 96 F, 98 F, 100 F    Specific [place] POCT 1.025 1.005, 1.015, 1.025    pH Qual Urine POCT 5 pH 4 pH, 5 pH, 7 pH, 9 pH    Creatinine Qual Urine POCT 100 mg/dL 20 mg/dL, 50 mg/dL, 100 mg/dL, 200 mg/dL    Internal QC Qual Urine POCT Valid Valid    Amphetamine Qual Urine POCT Negative Negative    Barbiturate Qual Urine POCT Negative Negative    Buprenorphine Qual Urine POCT Screen Positive (A) Negative    Benzodiazepine Qual Urine POCT Negative Negative    Cocaine Qual Urine POCT Negative Negative    Methamphetamine Qual Urine POCT Negative Negative    MDMA Qual Urine POCT Negative Negative    Methadone Qual Urine POCT Negative Negative    Opiate Qual Urine POCT Negative Negative    Oxycodone Qual Urine POCT Screen Positive (A) Negative    Phencyclidine Qual Urine POCT Negative Negative    THC Qual Urine POCT Negative Negative       At least 40min spent on day of visit in review of medical record,  review, obtaining histories, discussing recommendations, counseling      The longitudinal plan of care for the diagnosis(es)/condition(s) as documented were addressed during this visit. Due to the added complexity in care, I will continue to support Edgar in the subsequent management and with ongoing continuity of care.      Kassie Mckinley MD  Addiction Medicine  [facility]  [address], [facility] [address]  [place] [address] [phone]

## 2024-10-29 NOTE — TELEPHONE ENCOUNTER
When we are able to find out where he is and what pharmacy he would like to use, we can send bridges and arrange a virtual follow up appointment.  Or make the appointment tomorrow virtual.

## 2024-10-29 NOTE — TELEPHONE ENCOUNTER
Writer attempted to contact patient, no answer; left VM message asking for a return call to the Recovery Clinic.     Krista Erickson RN on 10/29/2024 at 10:02 AM

## 2024-10-30 ENCOUNTER — OFFICE VISIT (OUTPATIENT)
Dept: BEHAVIORAL HEALTH | Facility: CLINIC | Age: 49
End: 2024-10-30
Payer: COMMERCIAL

## 2024-10-30 VITALS — SYSTOLIC BLOOD PRESSURE: 119 MMHG | HEART RATE: 78 BPM | DIASTOLIC BLOOD PRESSURE: 83 MMHG

## 2024-10-30 DIAGNOSIS — F41.9 ANXIETY: ICD-10-CM

## 2024-10-30 DIAGNOSIS — Z79.891 ENCOUNTER FOR MONITORING OPIOID MAINTENANCE THERAPY: ICD-10-CM

## 2024-10-30 DIAGNOSIS — Z51.81 ENCOUNTER FOR MONITORING OPIOID MAINTENANCE THERAPY: ICD-10-CM

## 2024-10-30 DIAGNOSIS — F11.20 OPIOID USE DISORDER, SEVERE, DEPENDENCE (H): ICD-10-CM

## 2024-10-30 DIAGNOSIS — F10.90 ALCOHOL USE DISORDER: ICD-10-CM

## 2024-10-30 DIAGNOSIS — F29 PSYCHOSIS, UNSPECIFIED PSYCHOSIS TYPE (H): ICD-10-CM

## 2024-10-30 DIAGNOSIS — F39 MOOD DISORDER (H): ICD-10-CM

## 2024-10-30 DIAGNOSIS — F11.20 OPIOID USE DISORDER, SEVERE, DEPENDENCE (H): Primary | ICD-10-CM

## 2024-10-30 DIAGNOSIS — F15.90 STIMULANT USE DISORDER: ICD-10-CM

## 2024-10-30 DIAGNOSIS — F17.200 TOBACCO USE DISORDER: ICD-10-CM

## 2024-10-30 LAB
AMPHETAMINE QUAL URINE POCT: NEGATIVE
BARBITURATE QUAL URINE POCT: NEGATIVE
BENZODIAZEPINE QUAL URINE POCT: NEGATIVE
BUPRENORPHINE QUAL URINE POCT: ABNORMAL
COCAINE QUAL URINE POCT: NEGATIVE
CREAT UR-MCNC: 150 MG/DL
CREATININE QUAL URINE POCT: ABNORMAL
INTERNAL QC QUAL URINE POCT: ABNORMAL
MDMA QUAL URINE POCT: NEGATIVE
METHADONE QUAL URINE POCT: NEGATIVE
METHAMPHETAMINE QUAL URINE POCT: NEGATIVE
OPIATE QUAL URINE POCT: NEGATIVE
OXYCODONE QUAL URINE POCT: ABNORMAL
PH QUAL URINE POCT: ABNORMAL
PHENCYCLIDINE QUAL URINE POCT: NEGATIVE
POCT KIT EXPIRATION DATE: ABNORMAL
POCT KIT LOT NUMBER: ABNORMAL
SPECIFIC GRAVITY POCT: 1.02
TEMPERATURE URINE POCT: ABNORMAL
THC QUAL URINE POCT: NEGATIVE

## 2024-10-30 PROCEDURE — 99215 OFFICE O/P EST HI 40 MIN: CPT | Performed by: FAMILY MEDICINE

## 2024-10-30 PROCEDURE — G0481 DRUG TEST DEF 8-14 CLASSES: HCPCS

## 2024-10-30 PROCEDURE — G2211 COMPLEX E/M VISIT ADD ON: HCPCS | Performed by: FAMILY MEDICINE

## 2024-10-30 PROCEDURE — 80305 DRUG TEST PRSMV DIR OPT OBS: CPT | Performed by: FAMILY MEDICINE

## 2024-10-30 RX ORDER — BUPRENORPHINE AND NALOXONE 8; 2 MG/1; MG/1
3 FILM, SOLUBLE BUCCAL; SUBLINGUAL DAILY
Qty: 45 FILM | Refills: 0 | Status: SHIPPED | OUTPATIENT
Start: 2024-10-30 | End: 2024-11-13

## 2024-10-30 RX ORDER — CLONIDINE HYDROCHLORIDE 0.1 MG/1
0.1 TABLET ORAL 2 TIMES DAILY PRN
Qty: 30 TABLET | Refills: 0 | Status: SHIPPED | OUTPATIENT
Start: 2024-10-30 | End: 2024-11-13

## 2024-10-30 RX ORDER — RISPERIDONE 2 MG/1
2 TABLET ORAL DAILY
COMMUNITY

## 2024-10-30 RX ORDER — GABAPENTIN 800 MG/1
800 TABLET ORAL 3 TIMES DAILY
Qty: 45 TABLET | Refills: 0 | Status: SHIPPED | OUTPATIENT
Start: 2024-10-30 | End: 2024-11-13

## 2024-10-30 RX ORDER — PRAZOSIN HYDROCHLORIDE 2 MG/1
2 CAPSULE ORAL AT BEDTIME
COMMUNITY

## 2024-10-30 RX ORDER — BUPRENORPHINE HYDROCHLORIDE AND NALOXONE HYDROCHLORIDE DIHYDRATE 8; 2 MG/1; MG/1
0.5 TABLET SUBLINGUAL 2 TIMES DAILY
Qty: 15 TABLET | Refills: 0 | Status: SHIPPED | OUTPATIENT
Start: 2024-10-30 | End: 2024-11-13

## 2024-10-30 ASSESSMENT — PATIENT HEALTH QUESTIONNAIRE - PHQ9: SUM OF ALL RESPONSES TO PHQ QUESTIONS 1-9: 0

## 2024-10-30 NOTE — NURSING NOTE
[facility]      Rooming information:    Approximate last use of full opioid agonist: 10/6/24  Taking buprenorphine? Yes:   How much per day? 3  Number of buprenorphine films/tablets remaining currently: 0  Side effects related to buprenorphine (constipation, dry mouth, sedation?) No   Narcan currently available: Yes  Other recent substance use:    Denies  NICOTINE-Yes: cigs occassionally  If using nicotine, ready to quit? Yes: trying    Point of care urine drug screen positive for:  Lab Results   Component Value Date    BUP Screen Positive (A) 10/30/2024    BZO Negative 10/30/2024    BAR Negative 10/30/2024    CLIFFORD Negative 10/30/2024    MAMP Negative 10/30/2024    AMP Negative 10/30/2024    MDMA Negative 10/30/2024    MTD Negative 10/30/2024    [id number] Negative 10/30/2024    OXY Screen Positive (A) 10/30/2024    PCP Negative 10/30/2024    THC Negative 10/30/2024    TEMP 90 F 10/30/2024    SGPOCT 1.025 10/30/2024       *POC urine drug screen does not screen for Fentanyl      Depression Response    Patient completed the PHQ-9 assessment for depression and scored >9? No  Question 9 on the PHQ-9 was positive for suicidality? No  Does patient have current mental health provider? No  C-SSRS screener risk level.       Is this a virtual visit? No    I personally notified the following: NA          10/30/2024     9:00 AM   PHQ Assesment Total Score(s)   PHQ-9 Score 0         Housing needs: [facility]     Insurance: active    Current legal issues: none    Contact information up to date? yes    [employer] no today (please obtain RAMIRO if pt would like to include)    Orville Kent MA  October 30, 2024  9:50 AM

## 2024-11-05 LAB
BUPRENORPHINE UR CFM-MCNC: 562 NG/ML
BUPRENORPHINE/CREAT UR: 375 NG/MG {CREAT}
GABAPENTIN UR QL CFM: PRESENT
NALOXONE UR CFM-MCNC: 4340 NG/ML
NALOXONE: 2893 NG/MG {CREAT}
NORBUPRENORPHINE UR CFM-MCNC: 3920 NG/ML
NORBUPRENORPHINE/CREAT UR: 2613 NG/MG {CREAT}

## 2024-11-13 ENCOUNTER — OFFICE VISIT (OUTPATIENT)
Dept: BEHAVIORAL HEALTH | Facility: CLINIC | Age: 49
End: 2024-11-13
Payer: COMMERCIAL

## 2024-11-13 VITALS — HEART RATE: 77 BPM | DIASTOLIC BLOOD PRESSURE: 68 MMHG | OXYGEN SATURATION: 96 % | SYSTOLIC BLOOD PRESSURE: 108 MMHG

## 2024-11-13 DIAGNOSIS — Z51.81 ENCOUNTER FOR MONITORING OPIOID MAINTENANCE THERAPY: ICD-10-CM

## 2024-11-13 DIAGNOSIS — F29 PSYCHOSIS, UNSPECIFIED PSYCHOSIS TYPE (H): ICD-10-CM

## 2024-11-13 DIAGNOSIS — F39 MOOD DISORDER (H): ICD-10-CM

## 2024-11-13 DIAGNOSIS — Z79.891 ENCOUNTER FOR MONITORING OPIOID MAINTENANCE THERAPY: ICD-10-CM

## 2024-11-13 DIAGNOSIS — F17.200 TOBACCO USE DISORDER: ICD-10-CM

## 2024-11-13 DIAGNOSIS — F11.20 OPIOID USE DISORDER, SEVERE, DEPENDENCE (H): ICD-10-CM

## 2024-11-13 DIAGNOSIS — F41.9 ANXIETY: ICD-10-CM

## 2024-11-13 DIAGNOSIS — F10.90 ALCOHOL USE DISORDER: ICD-10-CM

## 2024-11-13 DIAGNOSIS — F11.20 OPIOID USE DISORDER, SEVERE, DEPENDENCE (H): Primary | ICD-10-CM

## 2024-11-13 RX ORDER — GABAPENTIN 800 MG/1
800 TABLET ORAL 3 TIMES DAILY
Qty: 45 TABLET | Refills: 0 | Status: SHIPPED | OUTPATIENT
Start: 2024-11-13

## 2024-11-13 RX ORDER — BUPRENORPHINE AND NALOXONE 8; 2 MG/1; MG/1
3 FILM, SOLUBLE BUCCAL; SUBLINGUAL DAILY
Qty: 45 FILM | Refills: 0 | Status: SHIPPED | OUTPATIENT
Start: 2024-11-13

## 2024-11-13 RX ORDER — CLONIDINE HYDROCHLORIDE 0.1 MG/1
0.1 TABLET ORAL DAILY PRN
Qty: 15 TABLET | Refills: 0 | Status: SHIPPED | OUTPATIENT
Start: 2024-11-13

## 2024-11-13 RX ORDER — BUPRENORPHINE HYDROCHLORIDE AND NALOXONE HYDROCHLORIDE DIHYDRATE 8; 2 MG/1; MG/1
0.5 TABLET SUBLINGUAL 2 TIMES DAILY
Qty: 15 TABLET | Refills: 0 | Status: SHIPPED | OUTPATIENT
Start: 2024-11-13

## 2024-11-13 ASSESSMENT — PATIENT HEALTH QUESTIONNAIRE - PHQ9: SUM OF ALL RESPONSES TO PHQ QUESTIONS 1-9: 8

## 2024-11-13 NOTE — NURSING NOTE
[facility]      Rooming information:    Approximate last use of full opioid agonist: 38 days ago, graduated from [facility] today and moving to [facility]  Taking buprenorphine? Yes: suboxone  How much per day? 8mg  Number of buprenorphine films/tablets remaining currently: completely out  Side effects related to buprenorphine (constipation, dry mouth, sedation?) No   Narcan currently available: Yes  Other recent substance use:    Denies  NICOTINE-Yes: cigs, 5 cigs per day  If using nicotine, ready to quit? No    Point of care urine drug screen positive for:  Lab Results   Component Value Date    BUP Screen Positive (A) 11/13/2024    BZO Negative 11/13/2024    BAR Negative 11/13/2024    CLIFFORD Negative 11/13/2024    MAMP Negative 11/13/2024    AMP Negative 11/13/2024    MDMA Negative 11/13/2024    MTD Negative 11/13/2024    [id number] Negative 11/13/2024    OXY Negative 11/13/2024    PCP Negative 11/13/2024    THC Negative 11/13/2024    TEMP 90 F 11/13/2024    SGPOCT 1.015 11/13/2024       *POC urine drug screen does not screen for Fentanyl      Depression Response    Patient completed the PHQ-9 assessment for depression and scored >9? No  Question 9 on the PHQ-9 was positive for suicidality? No  Does patient have current mental health provider? No  C-SSRS screener risk level.       Is this a virtual visit? No    I personally notified the following: n/a          11/13/2024     9:00 AM   PHQ Assesment Total Score(s)   PHQ-9 Score 8         Housing needs: Graduated from [facility], on his way to [facility] today    Insurance: active    Current legal issues: none    Contact information up to date? On file    [employer] [facility] (please obtain RAMIRO if pt would like to include)    Anna Guerrero MA  November 13, 2024  9:22 AM

## 2024-11-13 NOTE — PROGRESS NOTES
[facility] Follow Up    ASSESSMENT/PLAN                                                    ***      No follow-ups on file.      Patient counseling completed today:  Discussed mechanism of action, potential risks/benefits/side effects of medications and other recommendations above.    Harm reduction counseling including never use alone, availability of naloxone, risks associated with concurrent use of opioids and benzodiazepines, alcohol, or other sedatives, safer administration as applicable.  Discussed importance of avoiding isolation, building a network of supportive relationships, avoiding people/places/things associated with past use to reduce risk of relapse; including motivational interviewing regarding psychosocial interventions.   SUBJECTIVE                                                          CC/HPI:  Rm Merchant is a 49 year old male with PMH hepatitis C with evidence of spontaneous resolution, MDD, PTSD, psychosis unspecified vs bipolar vs schizoaffective d/o, polysubstance use disorder including opioids on buprenorphine who presents to the Recovery Clinic for return visit.       Brief History:  Rm Merchant was first seen in Recovery Clinic on 08/10/23. They were referred by staff at [facility] due to difficulties filling his most recent rx for buprenorphine through [facility].   - Lost to follow up after 11/23/23.  - RTC 3/8/24 following ~1 month of return to use of fentanyl.  Resumed buprenorphine 24mg/day.  In programming at [facility].  Lost to follow-up after 4/16/24.  - RTC 10/10/24.  S/p 6 month return to use of alcohol, fentanyl, methamphetamine, cannabis.  Rx to resume buprenorphine.  Started programming w/ [facility].   - 10/16/24 buprenorphine increased to 32mg/day      11/13/24 HPI:  ***  Graduated from [employer] today, going to start [facility].       Substance Use History :  Opioids:   Age at first use: 26 years heroin, use  increased to daily while incarcerated x21 years.  Released Spring 2023.   Current use: substance: fentanyl; quantity alot; route: insufflation ; timing of last use: 10/6/24                IV drug use: No   History of overdose: No  Previous residential or outpatient treatments for addiction : Yes: [facility] 6/7/23 completed residential then started OP  Previous medication treatments for addiction: Yes: suboxone  Longest period of sobriety: few years  Medical complications related to substance use: No  Hepatitis C: 6/20/24 HCV RNA not detected  HIV: 6/20/24 HIV ag/ab nonreactive     Other Addiction History:  Stimulants   Cocaine 2010 last use  Sedatives/hypnotics/anxiolytics:   no  Alcohol:   Past including during incarceration  Nicotine:   Yes, cigarettes 1/4 ppd and some vaping  Cannabis:   past  Hallucinogens/Dissociatives:   no  Eating disorder:  no  Gambling:              Yes, past        PAST PSYCHIATRIC HISTORY:  Diagnoses- PTSD, depression,anxiety  Suicide Attempts: No   Hospitalizations: No   Had initial appt with [facility] 10/28/24.   [facility] visit 10/23/24 to restart sertraline, resperidone, and prazosin.    Int Med at [facility] reconnecting with pt for ongoing med management.          10/16/2024     9:00 AM 10/30/2024     9:00 AM 11/13/2024     9:00 AM   PHQ   PHQ-9 Total Score 8 0 8   Q9: Thoughts of better off dead/self-harm past 2 weeks Not at all Not at all Not at all       Social History  Housing status: residential at [facility]  Relationship status: Single  Children: no children  Legal: parole 2025        [place] Prescription Drug Monitoring Program Reviewed:  Yes -   10/21/2024 10/21/2024 5 Buprenorphine-Nalox 8-2 Mg Tab 15.00 15 Ka Par [id number] Set ([id number]) 0/0 8.00 mg Other [place]   10/16/2024 10/16/2024 6 Suboxone 8 Mg-2 Mg Sl Film 45.00 15 Li Vol [id number] Marc ([id number]) 0/0 24.00 mg [place]   10/16/2024 10/16/2024 6 Gabapentin 600 Mg Tablet 45.00 15 Li Vol 7             Medications:  Current Outpatient Medications   Medication Sig Dispense Refill    buprenorphine HCl-naloxone HCl (SUBOXONE) 8-2 MG per film Place 3 Film under the tongue daily. 45 Film 0    buprenorphine-naloxone (SUBOXONE) 8-2 MG SUBL sublingual tablet Place 0.5 tablets under the tongue 2 times daily. 15 tablet 0    cloNIDine (CATAPRES) 0.1 MG tablet Take 1 tablet (0.1 mg) by mouth 2 times daily as needed (opioid withdrawal). 30 tablet 0    gabapentin (NEURONTIN) 800 MG tablet Take 1 tablet (800 mg) by mouth 3 times daily. 45 tablet 0    mupirocin (BACTROBAN) 2 % external ointment Apply topically 3 times daily. 15 g 0    naloxone (NARCAN) 4 MG/0.1ML nasal spray Spray 1 spray (4 mg) into one nostril alternating nostrils as needed for opioid reversal. every 2-3 minutes until assistance arrives 0.2 mL 11    prazosin (MINIPRESS) 2 MG capsule Take 2 mg by mouth at bedtime.      risperiDONE (RISPERDAL) 2 MG tablet Take 2 mg by mouth daily.      sertraline (ZOLOFT) 50 MG tablet Take 50 mg by mouth daily.       No current facility-administered medications for this visit.       No Known Allergies    PMH, PSH, FamHx reviewed      OBJECTIVE                                                      There were no vitals taken for this visit.    Physical Exam  Vitals reviewed.   Constitutional:       General: He is not in acute distress.  Eyes:      General: No scleral icterus.     Extraocular Movements: Extraocular movements intact.      Conjunctiva/sclera: Conjunctivae normal.   Pulmonary:      Effort: Pulmonary effort is normal.   Neurological:      General: No focal deficit present.      Mental Status: He is alert and oriented to person, place, and time.      Gait: Gait normal.   Psychiatric:         Attention and Perception: Attention normal. He perceives auditory hallucinations.         Mood and Affect: Mood normal. Affect is not inappropriate.         Speech: Speech normal.         Behavior: Behavior normal. Behavior is  cooperative.         Thought Content: Thought content normal. Thought content does not include suicidal ideation.      Comments: Insight and judgement fair to good         Labs:    *POC urine drug screen does not screen for Fentanyl      Recent Results (from the past 240 hours)   Drugs of Abuse Screen Urine (POC CUPS) POCT    Collection Time: 11/13/24  9:25 AM   Result Value Ref Range    POCT Kit Lot Number [id number]     POCT Kit Expiration Date 05/15/2026     Temperature Urine POCT 90 F 90 F, 92 F, 94 F, 96 F, 98 F, 100 F    Specific [place] POCT 1.015 1.005, 1.015, 1.025    pH Qual Urine POCT 5 pH 4 pH, 5 pH, 7 pH, 9 pH    Creatinine Qual Urine POCT 50 mg/dL 20 mg/dL, 50 mg/dL, 100 mg/dL, 200 mg/dL    Internal QC Qual Urine POCT Valid Valid    Amphetamine Qual Urine POCT Negative Negative    Barbiturate Qual Urine POCT Negative Negative    Buprenorphine Qual Urine POCT Screen Positive (A) Negative    Benzodiazepine Qual Urine POCT Negative Negative    Cocaine Qual Urine POCT Negative Negative    Methamphetamine Qual Urine POCT Negative Negative    MDMA Qual Urine POCT Negative Negative    Methadone Qual Urine POCT Negative Negative    Opiate Qual Urine POCT Negative Negative    Oxycodone Qual Urine POCT Negative Negative    Phencyclidine Qual Urine POCT Negative Negative    THC Qual Urine POCT Negative Negative         At least ***min spent on day of visit in review of medical record,  review, obtaining histories, discussing recommendations, counseling      The longitudinal plan of care for the diagnosis(es)/condition(s) as documented were addressed during this visit. Due to the added complexity in care, I will continue to support Edgar in the subsequent management and with ongoing continuity of care.      Kassie Mckinley MD  Addiction Medicine  [facility]  [address] [facility], [address] [address]  [phone]       Behavior normal. Behavior is cooperative.         Thought Content: Thought content normal. Thought content does not include suicidal ideation.      Comments: Insight and judgement fair to good         Labs:    *POC urine drug screen does not screen for Fentanyl      Recent Results (from the past 240 hours)   Drugs of Abuse Screen Urine (POC CUPS) POCT    Collection Time: 11/13/24  9:25 AM   Result Value Ref Range    POCT Kit Lot Number [id number]     POCT Kit Expiration Date 05/15/2026     Temperature Urine POCT 90 F 90 F, 92 F, 94 F, 96 F, 98 F, 100 F    Specific [place] POCT 1.015 1.005, 1.015, 1.025    pH Qual Urine POCT 5 pH 4 pH, 5 pH, 7 pH, 9 pH    Creatinine Qual Urine POCT 50 mg/dL 20 mg/dL, 50 mg/dL, 100 mg/dL, 200 mg/dL    Internal QC Qual Urine POCT Valid Valid    Amphetamine Qual Urine POCT Negative Negative    Barbiturate Qual Urine POCT Negative Negative    Buprenorphine Qual Urine POCT Screen Positive (A) Negative    Benzodiazepine Qual Urine POCT Negative Negative    Cocaine Qual Urine POCT Negative Negative    Methamphetamine Qual Urine POCT Negative Negative    MDMA Qual Urine POCT Negative Negative    Methadone Qual Urine POCT Negative Negative    Opiate Qual Urine POCT Negative Negative    Oxycodone Qual Urine POCT Negative Negative    Phencyclidine Qual Urine POCT Negative Negative    THC Qual Urine POCT Negative Negative           The longitudinal plan of care for the diagnosis(es)/condition(s) as documented were addressed during this visit. Due to the added complexity in care, I will continue to support Edgar in the subsequent management and with ongoing continuity of care.      Kassie Mckinley MD  Addiction Medicine  [place] [id number] [address] [address]  [phone]

## 2024-12-19 ENCOUNTER — OFFICE VISIT (OUTPATIENT)
Dept: BEHAVIORAL HEALTH | Facility: CLINIC | Age: 49
End: 2024-12-19
Payer: COMMERCIAL

## 2024-12-19 VITALS — HEART RATE: 74 BPM | DIASTOLIC BLOOD PRESSURE: 89 MMHG | SYSTOLIC BLOOD PRESSURE: 138 MMHG

## 2024-12-19 DIAGNOSIS — F10.90 ALCOHOL USE DISORDER: ICD-10-CM

## 2024-12-19 DIAGNOSIS — F15.90 STIMULANT USE DISORDER: ICD-10-CM

## 2024-12-19 DIAGNOSIS — F17.200 TOBACCO USE DISORDER: ICD-10-CM

## 2024-12-19 DIAGNOSIS — Z51.81 ENCOUNTER FOR MONITORING OPIOID MAINTENANCE THERAPY: ICD-10-CM

## 2024-12-19 DIAGNOSIS — F29 PSYCHOSIS, UNSPECIFIED PSYCHOSIS TYPE (H): ICD-10-CM

## 2024-12-19 DIAGNOSIS — F41.9 ANXIETY: ICD-10-CM

## 2024-12-19 DIAGNOSIS — F11.93 OPIOID WITHDRAWAL (H): ICD-10-CM

## 2024-12-19 DIAGNOSIS — F11.20 OPIOID USE DISORDER, SEVERE, DEPENDENCE (H): Primary | ICD-10-CM

## 2024-12-19 DIAGNOSIS — Z79.891 ENCOUNTER FOR MONITORING OPIOID MAINTENANCE THERAPY: ICD-10-CM

## 2024-12-19 DIAGNOSIS — F39 MOOD DISORDER (H): ICD-10-CM

## 2024-12-19 LAB
AMPHETAMINE QUAL URINE POCT: ABNORMAL
BARBITURATE QUAL URINE POCT: NEGATIVE
BENZODIAZEPINE QUAL URINE POCT: NEGATIVE
BUPRENORPHINE QUAL URINE POCT: ABNORMAL
COCAINE QUAL URINE POCT: NEGATIVE
CREATININE QUAL URINE POCT: ABNORMAL
INTERNAL QC QUAL URINE POCT: ABNORMAL
MDMA QUAL URINE POCT: NEGATIVE
METHADONE QUAL URINE POCT: NEGATIVE
METHAMPHETAMINE QUAL URINE POCT: ABNORMAL
OPIATE QUAL URINE POCT: NEGATIVE
OXYCODONE QUAL URINE POCT: NEGATIVE
PH QUAL URINE POCT: ABNORMAL
PHENCYCLIDINE QUAL URINE POCT: NEGATIVE
POCT KIT EXPIRATION DATE: ABNORMAL
POCT KIT LOT NUMBER: ABNORMAL
SPECIFIC GRAVITY POCT: 1.02
TEMPERATURE URINE POCT: ABNORMAL
THC QUAL URINE POCT: ABNORMAL

## 2024-12-19 RX ORDER — BUPRENORPHINE AND NALOXONE 8; 2 MG/1; MG/1
3 FILM, SOLUBLE BUCCAL; SUBLINGUAL DAILY
Qty: 45 FILM | Refills: 0 | Status: SHIPPED | OUTPATIENT
Start: 2024-12-19

## 2024-12-19 RX ORDER — BUPRENORPHINE HYDROCHLORIDE AND NALOXONE HYDROCHLORIDE DIHYDRATE 8; 2 MG/1; MG/1
0.5 TABLET SUBLINGUAL 2 TIMES DAILY
Qty: 15 TABLET | Refills: 0 | Status: SHIPPED | OUTPATIENT
Start: 2024-12-19

## 2024-12-19 RX ORDER — GABAPENTIN 800 MG/1
800 TABLET ORAL 3 TIMES DAILY
Qty: 45 TABLET | Refills: 0 | Status: SHIPPED | OUTPATIENT
Start: 2024-12-19

## 2024-12-19 RX ORDER — CLONIDINE HYDROCHLORIDE 0.1 MG/1
0.1 TABLET ORAL 2 TIMES DAILY PRN
Qty: 10 TABLET | Refills: 0 | Status: SHIPPED | OUTPATIENT
Start: 2024-12-19

## 2024-12-19 ASSESSMENT — PATIENT HEALTH QUESTIONNAIRE - PHQ9: SUM OF ALL RESPONSES TO PHQ QUESTIONS 1-9: 9

## 2024-12-19 ASSESSMENT — COLUMBIA-SUICIDE SEVERITY RATING SCALE - C-SSRS
1. IN THE PAST MONTH, HAVE YOU WISHED YOU WERE DEAD OR WISHED YOU COULD GO TO SLEEP AND NOT WAKE UP?: NO
2. HAVE YOU ACTUALLY HAD ANY THOUGHTS OF KILLING YOURSELF?: NO
6. HAVE YOU EVER DONE ANYTHING, STARTED TO DO ANYTHING, OR PREPARED TO DO ANYTHING TO END YOUR LIFE?: NO
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
## 2025-01-08 ENCOUNTER — HOSPITAL ENCOUNTER (EMERGENCY)
Facility: CLINIC | Age: 50
Discharge: HOME | End: 2025-01-09
Attending: EMERGENCY MEDICINE | Admitting: EMERGENCY MEDICINE
Payer: COMMERCIAL

## 2025-01-08 VITALS
HEART RATE: 76 BPM | RESPIRATION RATE: 18 BRPM | OXYGEN SATURATION: 97 % | SYSTOLIC BLOOD PRESSURE: 165 MMHG | DIASTOLIC BLOOD PRESSURE: 98 MMHG | TEMPERATURE: 98.4 F

## 2025-01-08 DIAGNOSIS — S09.90XA CLOSED HEAD INJURY, INITIAL ENCOUNTER: ICD-10-CM

## 2025-01-08 DIAGNOSIS — S00.81XA ABRASION OF FOREHEAD, INITIAL ENCOUNTER: ICD-10-CM

## 2025-01-08 PROCEDURE — 99284 EMERGENCY DEPT VISIT MOD MDM: CPT | Mod: 25

## 2025-01-08 RX ORDER — ACETAMINOPHEN 325 MG/1
975 TABLET ORAL ONCE
Status: COMPLETED | OUTPATIENT
Start: 2025-01-09 | End: 2025-01-09

## 2025-01-09 ENCOUNTER — APPOINTMENT (OUTPATIENT)
Dept: CT IMAGING | Facility: CLINIC | Age: 50
End: 2025-01-09
Attending: EMERGENCY MEDICINE
Payer: COMMERCIAL

## 2025-01-09 PROCEDURE — 250N000013 HC RX MED GY IP 250 OP 250 PS 637: Performed by: EMERGENCY MEDICINE

## 2025-01-09 PROCEDURE — 70450 CT HEAD/BRAIN W/O DYE: CPT

## 2025-01-09 RX ADMIN — ACETAMINOPHEN 975 MG: 325 TABLET, FILM COATED ORAL at 00:01
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
## 2025-03-20 ENCOUNTER — OFFICE VISIT (OUTPATIENT)
Dept: BEHAVIORAL HEALTH | Facility: CLINIC | Age: 50
End: 2025-03-20
Payer: COMMERCIAL

## 2025-03-20 VITALS — DIASTOLIC BLOOD PRESSURE: 91 MMHG | OXYGEN SATURATION: 95 % | SYSTOLIC BLOOD PRESSURE: 145 MMHG | HEART RATE: 78 BPM

## 2025-03-20 DIAGNOSIS — F11.93 OPIOID WITHDRAWAL (H): ICD-10-CM

## 2025-03-20 DIAGNOSIS — F10.90 ALCOHOL USE DISORDER: ICD-10-CM

## 2025-03-20 DIAGNOSIS — Z79.891 ENCOUNTER FOR MONITORING OPIOID MAINTENANCE THERAPY: ICD-10-CM

## 2025-03-20 DIAGNOSIS — F29 PSYCHOSIS, UNSPECIFIED PSYCHOSIS TYPE (H): ICD-10-CM

## 2025-03-20 DIAGNOSIS — Z51.81 ENCOUNTER FOR MONITORING OPIOID MAINTENANCE THERAPY: ICD-10-CM

## 2025-03-20 DIAGNOSIS — F15.90 STIMULANT USE DISORDER: ICD-10-CM

## 2025-03-20 DIAGNOSIS — F11.20 OPIOID USE DISORDER, SEVERE, DEPENDENCE (H): Primary | ICD-10-CM

## 2025-03-20 DIAGNOSIS — F39 MOOD DISORDER: ICD-10-CM

## 2025-03-20 LAB
AMPHETAMINE QUAL URINE POCT: NEGATIVE
BARBITURATE QUAL URINE POCT: NEGATIVE
BENZODIAZEPINE QUAL URINE POCT: NEGATIVE
BUPRENORPHINE QUAL URINE POCT: NEGATIVE
C TRACH DNA SPEC QL NAA+PROBE: NEGATIVE
COCAINE QUAL URINE POCT: NEGATIVE
CREAT UR-MCNC: 85 MG/DL
CREATININE QUAL URINE POCT: ABNORMAL
INTERNAL QC QUAL URINE POCT: ABNORMAL
MDMA QUAL URINE POCT: NEGATIVE
METHADONE QUAL URINE POCT: NEGATIVE
METHAMPHETAMINE QUAL URINE POCT: ABNORMAL
N GONORRHOEA DNA SPEC QL NAA+PROBE: NEGATIVE
OPIATE QUAL URINE POCT: NEGATIVE
OXYCODONE QUAL URINE POCT: NEGATIVE
PH QUAL URINE POCT: ABNORMAL
PHENCYCLIDINE QUAL URINE POCT: NEGATIVE
POCT KIT EXPIRATION DATE: ABNORMAL
POCT KIT LOT NUMBER: ABNORMAL
SPECIFIC GRAVITY POCT: 1.01
SPECIMEN TYPE: NORMAL
SPECIMEN TYPE: NORMAL
TEMPERATURE URINE POCT: ABNORMAL
THC QUAL URINE POCT: NEGATIVE

## 2025-03-20 RX ORDER — BUPRENORPHINE HYDROCHLORIDE AND NALOXONE HYDROCHLORIDE DIHYDRATE 8; 2 MG/1; MG/1
0.5 TABLET SUBLINGUAL 2 TIMES DAILY
Qty: 7 TABLET | Refills: 0 | Status: SHIPPED | OUTPATIENT
Start: 2025-03-20

## 2025-03-20 RX ORDER — BUPRENORPHINE AND NALOXONE 8; 2 MG/1; MG/1
3 FILM, SOLUBLE BUCCAL; SUBLINGUAL DAILY
Qty: 21 FILM | Refills: 0 | Status: SHIPPED | OUTPATIENT
Start: 2025-03-20

## 2025-03-20 RX ORDER — GABAPENTIN 800 MG/1
800 TABLET ORAL 3 TIMES DAILY
Qty: 21 TABLET | Refills: 0 | Status: SHIPPED | OUTPATIENT
Start: 2025-03-20

## 2025-03-20 RX ORDER — CLONIDINE HYDROCHLORIDE 0.1 MG/1
0.1 TABLET ORAL 2 TIMES DAILY PRN
Qty: 10 TABLET | Refills: 0 | Status: SHIPPED | OUTPATIENT
Start: 2025-03-20

## 2025-03-20 RX ORDER — ONDANSETRON 4 MG/1
4 TABLET, ORALLY DISINTEGRATING ORAL EVERY 8 HOURS PRN
Qty: 10 TABLET | Refills: 0 | Status: SHIPPED | OUTPATIENT
Start: 2025-03-20

## 2025-03-20 ASSESSMENT — PATIENT HEALTH QUESTIONNAIRE - PHQ9: SUM OF ALL RESPONSES TO PHQ QUESTIONS 1-9: 0

## 2025-03-20 NOTE — NURSING NOTE
[facility]      Rooming information:    Was in senior care for the past 2 months. Released from senior care last night. Was taking  32 mg in senior care per patient. Was using fentanyl and methamphetamines, THC while in senior care.     Hoping to get into treatment. Homeless.     Would like a resource for a phone.    Going to get MH meds refilled at [facility] today. Needs clonidine, gabapentin and Suboxone today.     Needs Narcan     Approximate last use of full opioid agonist: 3/19/2025 7:00 pm   Taking buprenorphine? Yes, last dose was in senior care.   Number of buprenorphine films/tablets remaining currently: 0  NA  Narcan currently available: No  Other recent substance use:    Alcohol , Cannabis , Methamphetamine , and fentanyl   NICOTINE-Yes: cigarettes  If using nicotine, ready to quit? Yes: working on it     Point of care urine drug screen positive for:  Lab Results   Component Value Date    BUP Negative 03/20/2025    BZO Negative 03/20/2025    BAR Negative 03/20/2025    CLIFFORD Negative 03/20/2025    MAMP Screen Positive (A) 03/20/2025    AMP Negative 03/20/2025    MDMA Negative 03/20/2025    MTD Negative 03/20/2025    FCP166 Negative 03/20/2025    OXY Negative 03/20/2025    PCP Negative 03/20/2025    THC Negative 03/20/2025    TEMP 90 F 03/20/2025    SGPOCT 1.015 03/20/2025       *POC urine drug screen does not screen for Fentanyl    Depression Response    Patient completed the PHQ-9 assessment for depression and scored >9? No  Question 9 on the PHQ-9 was positive for suicidality? No  Does patient have current mental health provider? Yes - has a provider at a [facility] location. Going to see them after    C-SSRS screener risk level.       Is this a virtual visit? No    I personally notified the following: NA          3/20/2025     9:00 AM   PHQ Assesment Total Score(s)   PHQ-9 Score 0         Housing needs: On the street. Hoping to get into treatment.     Insurance: Yes    Current legal issues: None    Contact  information up to date? No current phone     3rd Party Involvement: None today  (please obtain RAMIRO if pt would like to include)    Verna Doyle RN  March 20, 2025  9:43 AM

## 2025-03-20 NOTE — PATIENT INSTRUCTIONS
Buprenorphine Self Start:    1) Take a day off and have a place to rest.  2) Stop using, and wait until you feel very sick from withdrawals (24-48 hours).  When you feel ready to start suboxone, take a dose of clonidine and gabapentin and wait 30 minutes  3) Dose  two 8mg films or tablets under your tongue (first dose 16 mg)  4) Take another one or two 8mg tablets an hour later to feel well (can take these immediately after first dose if your symptoms get worse)  5) The next day, take 32 mg daily     Clonidine 0.1 mg three times daily as needed for withdrawal  Gabapentin 300-600 three times daily as needed for anxiety, restlessness  Ondansetron 4 mg every 8 hours as needed for nausea
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
## 2025-03-24 LAB
AMPHET UR CFM-MCNC: 2260 NG/ML
AMPHET/CREAT UR: 2659 NG/MG {CREAT}
FENTANYL UR CFM-MCNC: 130 NG/ML
FENTANYL/CREAT UR: 153 NG/MG {CREAT}
METHAMPHET UR CFM-MCNC: 9280 NG/ML
METHAMPHET/CREAT UR: ABNORMAL NG/MG {CREAT}
NORFENTANYL UR CFM-MCNC: 52 NG/ML
NORFENTANYL/CREAT UR: 61 NG/MG {CREAT}

## 2025-03-27 ENCOUNTER — TELEPHONE (OUTPATIENT)
Dept: BEHAVIORAL HEALTH | Facility: CLINIC | Age: 50
End: 2025-03-27

## 2025-03-27 ENCOUNTER — OFFICE VISIT (OUTPATIENT)
Dept: BEHAVIORAL HEALTH | Facility: CLINIC | Age: 50
End: 2025-03-27
Payer: COMMERCIAL

## 2025-03-27 VITALS — HEART RATE: 80 BPM | DIASTOLIC BLOOD PRESSURE: 118 MMHG | OXYGEN SATURATION: 100 % | SYSTOLIC BLOOD PRESSURE: 177 MMHG

## 2025-03-27 DIAGNOSIS — Z51.81 ENCOUNTER FOR MONITORING OPIOID MAINTENANCE THERAPY: ICD-10-CM

## 2025-03-27 DIAGNOSIS — F43.10 PTSD (POST-TRAUMATIC STRESS DISORDER): ICD-10-CM

## 2025-03-27 DIAGNOSIS — F11.20 OPIOID USE DISORDER, SEVERE, DEPENDENCE (H): Primary | ICD-10-CM

## 2025-03-27 DIAGNOSIS — F10.90 ALCOHOL USE DISORDER: ICD-10-CM

## 2025-03-27 DIAGNOSIS — F33.41 RECURRENT MAJOR DEPRESSIVE DISORDER, IN PARTIAL REMISSION: ICD-10-CM

## 2025-03-27 DIAGNOSIS — F29 PSYCHOSIS, UNSPECIFIED PSYCHOSIS TYPE (H): ICD-10-CM

## 2025-03-27 DIAGNOSIS — F39 MOOD DISORDER: ICD-10-CM

## 2025-03-27 DIAGNOSIS — I10 HYPERTENSION, UNSPECIFIED TYPE: ICD-10-CM

## 2025-03-27 DIAGNOSIS — F15.90 STIMULANT USE DISORDER: ICD-10-CM

## 2025-03-27 DIAGNOSIS — Z79.891 ENCOUNTER FOR MONITORING OPIOID MAINTENANCE THERAPY: ICD-10-CM

## 2025-03-27 DIAGNOSIS — F11.20 OPIOID USE DISORDER, SEVERE, DEPENDENCE (H): ICD-10-CM

## 2025-03-27 LAB
AMPHETAMINE QUAL URINE POCT: ABNORMAL
BARBITURATE QUAL URINE POCT: NEGATIVE
BENZODIAZEPINE QUAL URINE POCT: NEGATIVE
BUPRENORPHINE QUAL URINE POCT: ABNORMAL
COCAINE QUAL URINE POCT: NEGATIVE
CREATININE QUAL URINE POCT: ABNORMAL
INTERNAL QC QUAL URINE POCT: ABNORMAL
MDMA QUAL URINE POCT: NEGATIVE
METHADONE QUAL URINE POCT: NEGATIVE
METHAMPHETAMINE QUAL URINE POCT: ABNORMAL
OPIATE QUAL URINE POCT: NEGATIVE
OXYCODONE QUAL URINE POCT: NEGATIVE
PH QUAL URINE POCT: ABNORMAL
PHENCYCLIDINE QUAL URINE POCT: NEGATIVE
POCT KIT EXPIRATION DATE: ABNORMAL
POCT KIT LOT NUMBER: ABNORMAL
SPECIFIC GRAVITY POCT: 1.02
TEMPERATURE URINE POCT: ABNORMAL
THC QUAL URINE POCT: NEGATIVE

## 2025-03-27 RX ORDER — BUPRENORPHINE AND NALOXONE 12; 3 MG/1; MG/1
1 FILM, SOLUBLE BUCCAL; SUBLINGUAL 2 TIMES DAILY
Qty: 30 FILM | Refills: 0 | Status: SHIPPED | OUTPATIENT
Start: 2025-03-27 | End: 2025-03-27

## 2025-03-27 RX ORDER — BUPRENORPHINE AND NALOXONE 8; 2 MG/1; MG/1
3 FILM, SOLUBLE BUCCAL; SUBLINGUAL DAILY
Qty: 45 FILM | Refills: 0 | Status: SHIPPED | OUTPATIENT
Start: 2025-03-27

## 2025-03-27 RX ORDER — GABAPENTIN 800 MG/1
800 TABLET ORAL 3 TIMES DAILY
Qty: 45 TABLET | Refills: 0 | Status: SHIPPED | OUTPATIENT
Start: 2025-03-27

## 2025-03-27 RX ORDER — RISPERIDONE 2 MG/1
2 TABLET ORAL DAILY
Qty: 30 TABLET | Refills: 0 | Status: SHIPPED | OUTPATIENT
Start: 2025-03-27

## 2025-03-27 RX ORDER — BUPRENORPHINE HYDROCHLORIDE AND NALOXONE HYDROCHLORIDE DIHYDRATE 8; 2 MG/1; MG/1
0.5 TABLET SUBLINGUAL 2 TIMES DAILY
Qty: 15 TABLET | Refills: 0 | Status: SHIPPED | OUTPATIENT
Start: 2025-03-27

## 2025-03-27 RX ORDER — BUPRENORPHINE AND NALOXONE 8; 2 MG/1; MG/1
1 FILM, SOLUBLE BUCCAL; SUBLINGUAL DAILY
Qty: 15 FILM | Refills: 0 | Status: SHIPPED | OUTPATIENT
Start: 2025-03-27 | End: 2025-03-27

## 2025-03-27 RX ORDER — PRAZOSIN HYDROCHLORIDE 2 MG/1
2 CAPSULE ORAL AT BEDTIME
Qty: 30 CAPSULE | Refills: 0 | Status: SHIPPED | OUTPATIENT
Start: 2025-03-27

## 2025-03-27 ASSESSMENT — PATIENT HEALTH QUESTIONNAIRE - PHQ9: SUM OF ALL RESPONSES TO PHQ QUESTIONS 1-9: 0

## 2025-03-27 NOTE — PROGRESS NOTES
[facility] Individual Session Summary     Session Start Time: 10:54 am     Session End Time: 11:14 am     Duration:20 minutes      DATA: Peer  met with Edgar Pardeep Hollowaygabbysissy in the Recovery Clinic to introduce himself and to provide patient further support and resources for their recovery.  Engaged in a discussion regarding where pt is at in terms of their recovery. Pt stated DOC Fentanyl, last use 2 days ago.  Pt stated he wanted an assessment.  Pt left without allowing PRC to assign an assessment.    Intervention: Facilitated an individual session, utilized active listening, provided validation, utilized motivational interviewing techniques, provided shared experience.    Assessment: Patient appeared anxious    Plan: Peer  will continue to provide support as needed. Provided patient with business card to pt encouraging pt to reach out to peer  if needed additional support and resources.        Patient Identified Goals  To continue to utilize their suboxone as prescribed., To attend and participate in a sober support group meeting., and To continue to take my other medications as prescribed.    Support Needs:   Ongoing care, support and resources for opioid substance use disorder as well as other substances.       Key Risk Factors to Recovery:   PRC encourages being aware of risk factors that can lead to re-use which include avoiding isolation, avoiding triggers and managing cravings in a healthy manner. being open and willing to acceptance and change on a daily basis.  PRC encourages pt to establish a sober network calling tree to reach out to when needed.  Continue to practice honesty with ourselves and trusted support person(s).   PRC encourages regular attendance at recovery based meetings as well as finding a sponsor for mentoring and accountability.   PRC encourages consideration of other services such as counseling for mental health  issues which can correlate with our substance use.      Alex Cao  March 27, 2025  4:58 PM    Attestation: Agueda Avila, [facility], LADC Provides oversight and supervision of care.      [place] [id number], [address]   [place], [place], [address]  Clinic Phone: [phone]  Clinic Fax: [phone]    Open Monday - Friday  9:00am-4:00pm  Walk in hours: 9am-3pm

## 2025-03-27 NOTE — TELEPHONE ENCOUNTER
This patient was a no call/no show to his 5:00 pm substance use disorder assessment appointment today on 3/27/2025.  The patient had not arrived to [facility] for the appointment as of 5:20 pm.  The patient should be directed to call the [facility] Intake department at [phone] to re-schedule the substance use disorder assessment as needed.

## 2025-03-27 NOTE — PROGRESS NOTES
[facility] Follow Up    ASSESSMENT/PLAN                                                      1. Opioid use disorder, severe, dependence (H) (Primary)  Patient reporting his symptoms are controlled with 32 mg of suboxone, and denies return to use.   Expressing interest in going to [facility] residential program. PRS met with patient and scheduled arnold eval for this after noon.   Continue suboxone unchanged. Due to insurance, RX for films and tablets provided.   Confirms narcan access  - buprenorphine HCl-naloxone HCl (SUBOXONE) 8-2 MG per film; Place 3 Film under the tongue daily.  Dispense: 45 Film; Refill: 0  - buprenorphine-naloxone (SUBOXONE) 8-2 MG SUBL sublingual tablet; Place 0.5 tablets under the tongue 2 times daily.  Dispense: 15 tablet; Refill: 0    2. Alcohol use disorder  Intermittent alcohol use, gabapentin helping to control his anxiety which can trigger alcohol use.   Continue gabapentin unchanged.  Proceed with plans for residential programming  - gabapentin (NEURONTIN) 800 MG tablet; Take 1 tablet (800 mg) by mouth 3 times daily.  Dispense: 45 tablet; Refill: 0    3. Mood disorder  4. Psychosis, unspecified psychosis type (H)  Denies paranoia, hallucinations. Was unable to get into see his provider, requesting refill for risperidone. Encouraged to schedule appointment with his PCP or psychiatry for further refills.   - risperiDONE (RISPERDAL) 2 MG tablet; Take 1 tablet (2 mg) by mouth daily.  Dispense: 30 tablet; Refill: 0    5. Recurrent major depressive disorder, in partial remission    - sertraline (ZOLOFT) 50 MG tablet; Take 1 tablet (50 mg) by mouth daily.  Dispense: 30 tablet; Refill: 0    6. PTSD (post-traumatic stress disorder)  Providing refill until he can be seen by his provider.   - prazosin (MINIPRESS) 2 MG capsule; Take 1 capsule (2 mg) by mouth at bedtime.  Dispense: 30 capsule; Refill: 0    7. Stimulant use disorder  Reporting intermittent meth use,  encouraged plans to start treatment at [facility].     8. High Blood pressure   BP elevated while in clinic today 177/118 without diagnosis of hypertension was noted after patient left clinic. Discuss at follow up    Return in about 2 weeks (around 4/10/2025) for Follow up, in person walk in.      Patient counseling completed today:  Discussed mechanism of action, potential risks/benefits/side effects of medications and other recommendations above.     Discussed risk of precipitated withdrawal with initiation of buprenorphine in the presence of full opioid agonists.    Reviewed directions for initiation of buprenorphine to reduce risk of precipitated withdrawal and maximize efficacy.    Harm reduction counseling including never use alone, availability of naloxone, risks associated with concurrent use of opioids and benzodiazepines, alcohol, or other sedatives, safer administration as applicable.  Discussed importance of avoiding isolation, building a network of supportive relationships, avoiding people/places/things associated with past use to reduce risk of relapse; including motivational interviewing regarding psychosocial interventions.   SUBJECTIVE                                                          CC/HPI:  Rm Merchant is a 50 year old male with PMH hepatitis C with evidence of spontaneous clearing, MDD, PTSD, psychosis unspecified vs bipolar vs schizoaffective d/o, polysubstance use disorder including opioids on buprenorphine who presents to the Recovery Clinic for return visit.       Brief History:  Rm Merchant was first seen in Recovery Clinic on 08/10/23. They were referred by staff at [facility] due to difficulties filling rx for buprenorphine through [facility].   - Lost to follow up after 11/23/23.  - RTC 3/8/24 following ~1 month of return to use of fentanyl.  Resumed buprenorphine 24mg/day.  In programming at [facility].  - Lost to follow-up after 4/16/24.  - RTC  10/10/24.  S/p 6 month return to use of alcohol, fentanyl, methamphetamine, cannabis.  Rx to resume buprenorphine.  Started programming w/ [facility].   - 10/16/24 buprenorphine increased to 32mg/day  - [place] hammer 10/6-11/13/24  - Return to use for ~3 weeks after leaving [facility] residential  - RTC 12/19/24 to resume buprenorphine  -Lost to follow up 12/19/24, RTC 3/20/25    03/27/25 HPI:  Was able to start suboxone without issue, is taking 32 mg daily. Denies cravings or use.   Did use meth and some alcohol this week. Is wanting to go to treatment. PRS assisted with scheduling a arnold eval at [facility] today at 12 pm.   Was unable to be seen by psychiatry, needs his prazosin, sertraline, and risperidone.   Denies psychosis.     Recommendations last visit: 3/20/24   1. Opioid use disorder, severe, dependence (H) (Primary)  Patient was lost to follow up after his last visit in December, and returned to fentanyl and meth use, reports last use was yesterday at 7 pm. He would like to resume suboxone and go to treatment.   Resuming suboxone at previously effective dose, 32 mg.   Reviewed options for induction, patient prefers home induction. Reviewed instructions for high dose start.   Providing narcan access  Encouraged plans to go to treatment. Offered assistance with scheduling arnold eval for CD recommendation, patient declined states he is planning to complete assessment at [facility].   Declined meeting with PRS.   - Drugs of Abuse Screen Urine (POC CUPS) POCT  - buprenorphine HCl-naloxone HCl (SUBOXONE) 8-2 MG per film; Place 3 Film under the tongue daily.  Dispense: 21 Film; Refill: 0  - buprenorphine-naloxone (SUBOXONE) 8-2 MG SUBL sublingual tablet; Place 0.5 tablets under the tongue 2 times daily.  Dispense: 7 tablet; Refill: 0  - naloxone (NARCAN) 4 MG/0.1ML nasal spray; Spray 1 spray (4 mg) into one nostril alternating nostrils as needed for opioid reversal. every 2-3 minutes until assistance  "arrives  Dispense: 0.2 mL; Refill: 11     2. Opioid withdrawal (H)  Providing medications to mange withdrawal until therapeutic dose of buprenorphine is achieved.   Offered zofran and clonidine to be administered while in clinic, patient declined.   - cloNIDine (CATAPRES) 0.1 MG tablet; Take 1 tablet (0.1 mg) by mouth 2 times daily as needed (withdrawal symptoms).  Dispense: 10 tablet; Refill: 0  - ondansetron (ZOFRAN ODT) 4 MG ODT tab; Take 1 tablet (4 mg) by mouth every 8 hours as needed for nausea.  Dispense: 10 tablet; Refill: 0     3. Encounter for monitoring opioid maintenance therapy  - Drugs of Abuse Screen Urine (POC CUPS) POCT  - Drug Confirmation Panel Urine with Creat - lab collect; Future  - Drug Confirmation Panel Urine with Creat - lab collect     4. Alcohol use disorder  Reports last drink was 2 months ago. Requesting to refill of gabapentin, resuming at previously effective dose  Encouraged plans to start treatment.    - gabapentin (NEURONTIN) 800 MG tablet; Take 1 tablet (800 mg) by mouth 3 times daily.  Dispense: 21 tablet; Refill: 0     5. Stimulant use disorder  Report last use was yesterday.   Encouraged plans to start programming at [facility]  Discuss pharmacotherapy at follow up     6. Mood disorder  7. Psychosis, unspecified psychosis type (H)  Denies psychosis. Planning to see psychiatry today after leaving  to restart sertraline, risperidone, and prazosin.        Substance Use History :  Opioids:   Age at first use: 26 years heroin, use increased to daily while incarcerated x21 years.  Released Spring 2023.   Current use: substance: fentanyl; quantity \"alot;\" route: IN or inhaled ; timing of last use: 12/18/24                IV drug use: some IV use in the past  History of overdose: No  Previous residential or outpatient treatments for addiction : Yes: [address] 2023 completed residential then started OP  Previous medication treatments for addiction: Yes: suboxone  Longest period of " sobriety: few years  Medical complications related to substance use: No  Hepatitis C: 11/14/24 HCV RNA not detected  HIV: 11/14/24 HIV ag/ab nonreactive     Other Addiction History:  Stimulants   Methamphetamine (last use 12/17/24) h/o cocaine use  Sedatives/hypnotics/anxiolytics:   no  Alcohol:   Intermittent use for years; last use 12/17/24  Nicotine:   Yes, cigarettes 1/4 ppd and some vaping  Cannabis:   past  Hallucinogens/Dissociatives:   no  Eating disorder:  no  Gambling:              Yes, past        PAST PSYCHIATRIC HISTORY:  Diagnoses- PTSD, depression,anxiety, psychosis  Suicide Attempts: No   Hospitalizations: No   Had initial appt with [facility] 10/28/24.   [facility] APS visit 10/23/24 to restart sertraline, resperidone, and prazosin.    Int Med at [facility] reconnecting with pt for ongoing med management.        12/19/2024    10:11 AM 3/20/2025     9:00 AM 3/27/2025    10:00 AM   PHQ   PHQ-9 Total Score 9 0 0   Q9: Thoughts of better off dead/self-harm past 2 weeks Several days Not at all Not at all       Social History  Housing status: no permanent housing reported 12/19/24  Relationship status: Single  Children: no children  Legal: parole 2025      Labs discussed with patient?  Yes      [place] Prescription Drug Monitoring Program Reviewed:  Yes  03/20/2025 03/20/2025 4 Suboxone 8 Mg-2 Mg Sl Film 21.00 7 He Bat [id number] Marc ([id number]) 0/0 24.00 mg Medicaid [place]   03/20/2025 03/20/2025 4 Buprenorphine-Nalox 8-2 Mg Tab 7.00 7 He Bat [id number] Marc ([id number]) 0/0 8.00 mg [place]   03/20/2025 03/20/2025 4 Gabapentin 800 Mg Tablet 21.00 7 He Bat             Medications:  Current Outpatient Medications   Medication Sig Dispense Refill    buprenorphine HCl-naloxone HCl (SUBOXONE) 8-2 MG per film Place 3 Film under the tongue daily. 45 Film 0    buprenorphine-naloxone (SUBOXONE) 8-2 MG SUBL sublingual tablet Place 0.5 tablets under the tongue 2 times daily. 15 tablet 0    cloNIDine (CATAPRES) 0.1 MG  tablet Take 1 tablet (0.1 mg) by mouth 2 times daily as needed (withdrawal symptoms). 10 tablet 0    gabapentin (NEURONTIN) 800 MG tablet Take 1 tablet (800 mg) by mouth 3 times daily. 45 tablet 0    prazosin (MINIPRESS) 2 MG capsule Take 1 capsule (2 mg) by mouth at bedtime. 30 capsule 0    risperiDONE (RISPERDAL) 2 MG tablet Take 1 tablet (2 mg) by mouth daily. 30 tablet 0    sertraline (ZOLOFT) 50 MG tablet Take 1 tablet (50 mg) by mouth daily. 30 tablet 0    naloxone (NARCAN) 4 MG/0.1ML nasal spray Spray 1 spray (4 mg) into one nostril alternating nostrils as needed for opioid reversal. every 2-3 minutes until assistance arrives 0.2 mL 11    ondansetron (ZOFRAN ODT) 4 MG ODT tab Take 1 tablet (4 mg) by mouth every 8 hours as needed for nausea. (Patient not taking: Reported on 3/27/2025) 10 tablet 0     No current facility-administered medications for this visit.       No Known Allergies    PMH, PSH, FamHx reviewed      OBJECTIVE                                                      BP (!) 177/118   Pulse 80   SpO2 100%     Physical Exam  Pulmonary:      Effort: Pulmonary effort is normal. No respiratory distress.   Neurological:      General: No focal deficit present.      Mental Status: He is alert and oriented to person, place, and time.   Psychiatric:         Attention and Perception: Attention normal. He does not perceive auditory or visual hallucinations.         Mood and Affect: Mood normal.         Speech: Speech normal.         Behavior: Behavior is cooperative.         Thought Content: Thought content is not paranoid. Thought content does not include suicidal ideation.         Judgment: Judgment normal.         Labs:    UDS:    Lab Results   Component Value Date    BUP Screen Positive (A) 03/27/2025    BZO Negative 03/27/2025    BAR Negative 03/27/2025    CLIFFORD Negative 03/27/2025    MAMP Screen Positive (A) 03/27/2025    AMP Screen Positive (A) 03/27/2025    MDMA Negative 03/27/2025    MTD Negative  03/27/2025    [id number] Negative 03/27/2025    OXY Negative 03/27/2025    PCP Negative 03/27/2025    THC Negative 03/27/2025    TEMP Invalid (A) 03/27/2025    SGPOCT 1.025 03/27/2025     *POC urine drug screen does not screen for Fentanyl      Recent Results (from the past 240 hours)   Drugs of Abuse Screen Urine (POC CUPS) POCT    Collection Time: 03/20/25  9:56 AM   Result Value Ref Range    POCT Kit Lot Number [id number]     POCT Kit Expiration Date 2026-08-05     Temperature Urine POCT 90 F 90 F, 92 F, 94 F, 96 F, 98 F, 100 F    Specific [place] POCT 1.015 1.005, 1.015, 1.025    pH Qual Urine POCT 4 pH 4 pH, 5 pH, 7 pH, 9 pH    Creatinine Qual Urine POCT 50 mg/dL 20 mg/dL, 50 mg/dL, 100 mg/dL, 200 mg/dL    Internal QC Qual Urine POCT Valid Valid    Amphetamine Qual Urine POCT Negative Negative    Barbiturate Qual Urine POCT Negative Negative    Buprenorphine Qual Urine POCT Negative Negative    Benzodiazepine Qual Urine POCT Negative Negative    Cocaine Qual Urine POCT Negative Negative    Methamphetamine Qual Urine POCT Screen Positive (A) Negative    MDMA Qual Urine POCT Negative Negative    Methadone Qual Urine POCT Negative Negative    Opiate Qual Urine POCT Negative Negative    Oxycodone Qual Urine POCT Negative Negative    Phencyclidine Qual Urine POCT Negative Negative    THC Qual Urine POCT Negative Negative   NEISSERIA GONORRHOEA PCR    Collection Time: 03/20/25 11:05 AM    Specimen: Urine, Voided   Result Value Ref Range    Neisseria gonorrhoeae Negative Negative    Neisseria gonorrhoeae Specimen Source Urine, Voided    CHLAMYDIA TRACHOMATIS PCR    Collection Time: 03/20/25 11:05 AM    Specimen: Urine, Voided   Result Value Ref Range    Chlamydia trachomatis Negative Negative    Chlamydia trachomatis Specimen Source Urine, Voided    Urine Drug Confirmation Panel    Collection Time: 03/20/25 11:05 AM   Result Value Ref Range    Fentanyl ng/mL 130 (H) <3 ng/mL    Fentanyl 153 Absent ng/mg [creat]     Norfentanyl ng/mL 52 (H) <5 ng/mL    Norfentanyl 61 Absent ng/mg [creat]    Amphetamine ng/mL 2,260 (H) <50 ng/mL    Amphetamine 2,659 Absent ng/mg [creat]    Methamphetamine ng/mL 9,280 (H) <50 ng/mL    Methamphetamine 10,918 Absent ng/mg [creat]   Urine Creatinine for Drug Screen Panel    Collection Time: 03/20/25 11:05 AM   Result Value Ref Range    Creatinine Urine for Drug Screen 85 mg/dL   Drugs of Abuse Screen Urine (POC CUPS) POCT    Collection Time: 03/27/25 10:24 AM   Result Value Ref Range    POCT Kit Lot Number [id number]     POCT Kit Expiration Date 2026-09-19     Temperature Urine POCT Invalid (A) 90 F, 92 F, 94 F, 96 F, 98 F, 100 F    Specific [place] POCT 1.025 1.005, 1.015, 1.025    pH Qual Urine POCT 5 pH 4 pH, 5 pH, 7 pH, 9 pH    Creatinine Qual Urine POCT 50 mg/dL 20 mg/dL, 50 mg/dL, 100 mg/dL, 200 mg/dL    Internal QC Qual Urine POCT Valid Valid    Amphetamine Qual Urine POCT Screen Positive (A) Negative    Barbiturate Qual Urine POCT Negative Negative    Buprenorphine Qual Urine POCT Screen Positive (A) Negative    Benzodiazepine Qual Urine POCT Negative Negative    Cocaine Qual Urine POCT Negative Negative    Methamphetamine Qual Urine POCT Screen Positive (A) Negative    MDMA Qual Urine POCT Negative Negative    Methadone Qual Urine POCT Negative Negative    Opiate Qual Urine POCT Negative Negative    Oxycodone Qual Urine POCT Negative Negative    Phencyclidine Qual Urine POCT Negative Negative    THC Qual Urine POCT Negative Negative              Continued Complex Management  The longitudinal plan of care for Opioid Use Disorder (OUD), Alcohol Use Disorder (AUD), and Stimulant Use Disorder was addressed during this visit. Due to the added complexity in care, I will continue to support Edgar in the subsequent management and with ongoing continuity of care.    Zoey Rick, CNP    [place] [id number] [address]  [phone]

## 2025-03-27 NOTE — NURSING NOTE
" [facility]      Rooming information:    Not enough urine to send additional samples beyond drug confirmation. States he is getting tested for STD's tomorrow at PCP,    BP elevated 177/118    Feeling a lot better. Drinking fluids, getting out.     PCP will be ordering psych meds. Next visit is tomorrow.     Jenna soliman. Working on assessment for treatment through [facility].     Approximate last use of full opioid agonist: 3/19/2025  Taking buprenorphine? Yes:   How much per day? 8 mg QID spreads them out   Number of buprenorphine films/tablets remaining currently: 0  Side effects related to buprenorphine (constipation, dry mouth, sedation?) No   Narcan currently available: Yes  Other recent substance use:    \"Little bit of alcohol\"  NICOTINE-Yes:   If using nicotine, ready to quit? Yes: Just trying to be more active. Denies need for replacement.     Point of care urine drug screen positive for:  Lab Results   Component Value Date    BUP Screen Positive (A) 03/27/2025    BZO Negative 03/27/2025    BAR Negative 03/27/2025    CLIFFORD Negative 03/27/2025    MAMP Screen Positive (A) 03/27/2025    AMP Screen Positive (A) 03/27/2025    MDMA Negative 03/27/2025    MTD Negative 03/27/2025    [id number] Negative 03/27/2025    OXY Negative 03/27/2025    PCP Negative 03/27/2025    THC Negative 03/27/2025    TEMP Invalid (A) 03/27/2025    SGPOCT 1.025 03/27/2025       *POC urine drug screen does not screen for Fentanyl      Depression Response    Patient completed the PHQ-9 assessment for depression and scored >9? No  Question 9 on the PHQ-9 was positive for suicidality? No  Does patient have current mental health provider? No. Uses PCP for psych meds   C-SSRS screener risk level.             3/27/2025    10:00 AM   PHQ Assesment Total Score(s)   PHQ-9 Score 0         Housing needs: Not stable couch hoping     Insurance: Active    Current legal issues: None    Contact information up to date? Does not have " a working phone right now.     3rd Party Involvement: None today (please obtain RAMIRO if pt would like to include)    Verna Doyle RN  March 27, 2025  10:11 AM

## 2025-05-18 ENCOUNTER — HEALTH MAINTENANCE LETTER (OUTPATIENT)
Age: 50
End: 2025-05-18